# Patient Record
Sex: MALE | Race: WHITE | NOT HISPANIC OR LATINO
[De-identification: names, ages, dates, MRNs, and addresses within clinical notes are randomized per-mention and may not be internally consistent; named-entity substitution may affect disease eponyms.]

---

## 2018-09-24 PROBLEM — Z00.00 ENCOUNTER FOR PREVENTIVE HEALTH EXAMINATION: Status: ACTIVE | Noted: 2018-09-24

## 2018-10-25 ENCOUNTER — APPOINTMENT (OUTPATIENT)
Dept: INTERNAL MEDICINE | Facility: CLINIC | Age: 63
End: 2018-10-25

## 2022-05-19 ENCOUNTER — EMERGENCY (EMERGENCY)
Facility: HOSPITAL | Age: 67
LOS: 1 days | Discharge: HOME | End: 2022-05-19
Attending: EMERGENCY MEDICINE
Payer: COMMERCIAL

## 2022-05-19 VITALS
RESPIRATION RATE: 18 BRPM | DIASTOLIC BLOOD PRESSURE: 82 MMHG | OXYGEN SATURATION: 96 % | WEIGHT: 162.04 LBS | HEIGHT: 71 IN | SYSTOLIC BLOOD PRESSURE: 120 MMHG | TEMPERATURE: 98 F | HEART RATE: 91 BPM

## 2022-05-19 PROCEDURE — 93010 ELECTROCARDIOGRAM REPORT: CPT

## 2022-05-19 PROCEDURE — 99285 EMERGENCY DEPT VISIT HI MDM: CPT

## 2022-05-20 VITALS
HEART RATE: 65 BPM | TEMPERATURE: 98 F | OXYGEN SATURATION: 98 % | SYSTOLIC BLOOD PRESSURE: 133 MMHG | RESPIRATION RATE: 16 BRPM | DIASTOLIC BLOOD PRESSURE: 77 MMHG

## 2022-05-20 LAB
ALBUMIN SERPL ELPH-MCNC: 4.5 G/DL — SIGNIFICANT CHANGE UP (ref 3.3–5)
ALP SERPL-CCNC: 77 U/L — SIGNIFICANT CHANGE UP (ref 40–120)
ALT FLD-CCNC: 13 U/L — SIGNIFICANT CHANGE UP (ref 10–45)
ANION GAP SERPL CALC-SCNC: 10 MMOL/L — SIGNIFICANT CHANGE UP (ref 5–17)
ANISOCYTOSIS BLD QL: SLIGHT — SIGNIFICANT CHANGE UP
APPEARANCE UR: CLEAR — SIGNIFICANT CHANGE UP
AST SERPL-CCNC: 17 U/L — SIGNIFICANT CHANGE UP (ref 10–40)
BASE EXCESS BLDV CALC-SCNC: 3 MMOL/L — HIGH (ref -2–2)
BASOPHILS # BLD AUTO: 0 K/UL — SIGNIFICANT CHANGE UP (ref 0–0.2)
BASOPHILS NFR BLD AUTO: 0 % — SIGNIFICANT CHANGE UP (ref 0–2)
BILIRUB SERPL-MCNC: 0.6 MG/DL — SIGNIFICANT CHANGE UP (ref 0.2–1.2)
BILIRUB UR-MCNC: NEGATIVE — SIGNIFICANT CHANGE UP
BUN SERPL-MCNC: 13 MG/DL — SIGNIFICANT CHANGE UP (ref 7–23)
CA-I SERPL-SCNC: 1.23 MMOL/L — SIGNIFICANT CHANGE UP (ref 1.15–1.33)
CALCIUM SERPL-MCNC: 9.4 MG/DL — SIGNIFICANT CHANGE UP (ref 8.4–10.5)
CHLORIDE BLDV-SCNC: 103 MMOL/L — SIGNIFICANT CHANGE UP (ref 96–108)
CHLORIDE SERPL-SCNC: 104 MMOL/L — SIGNIFICANT CHANGE UP (ref 96–108)
CO2 BLDV-SCNC: 30 MMOL/L — HIGH (ref 22–26)
CO2 SERPL-SCNC: 25 MMOL/L — SIGNIFICANT CHANGE UP (ref 22–31)
COLOR SPEC: YELLOW — SIGNIFICANT CHANGE UP
CREAT SERPL-MCNC: 0.62 MG/DL — SIGNIFICANT CHANGE UP (ref 0.5–1.3)
DACRYOCYTES BLD QL SMEAR: SLIGHT — SIGNIFICANT CHANGE UP
DIFF PNL FLD: NEGATIVE — SIGNIFICANT CHANGE UP
EGFR: 105 ML/MIN/1.73M2 — SIGNIFICANT CHANGE UP
ELLIPTOCYTES BLD QL SMEAR: SLIGHT — SIGNIFICANT CHANGE UP
EOSINOPHIL # BLD AUTO: 0 K/UL — SIGNIFICANT CHANGE UP (ref 0–0.5)
EOSINOPHIL NFR BLD AUTO: 0 % — SIGNIFICANT CHANGE UP (ref 0–6)
FLUAV AG NPH QL: SIGNIFICANT CHANGE UP
FLUBV AG NPH QL: SIGNIFICANT CHANGE UP
GAS PNL BLDV: 136 MMOL/L — SIGNIFICANT CHANGE UP (ref 136–145)
GAS PNL BLDV: SIGNIFICANT CHANGE UP
GAS PNL BLDV: SIGNIFICANT CHANGE UP
GIANT PLATELETS BLD QL SMEAR: PRESENT — SIGNIFICANT CHANGE UP
GLUCOSE BLDV-MCNC: 100 MG/DL — HIGH (ref 70–99)
GLUCOSE SERPL-MCNC: 102 MG/DL — HIGH (ref 70–99)
GLUCOSE UR QL: NEGATIVE — SIGNIFICANT CHANGE UP
HCO3 BLDV-SCNC: 29 MMOL/L — SIGNIFICANT CHANGE UP (ref 22–29)
HCT VFR BLD CALC: 39 % — SIGNIFICANT CHANGE UP (ref 39–50)
HCT VFR BLDA CALC: 40 % — SIGNIFICANT CHANGE UP (ref 39–51)
HGB BLD CALC-MCNC: 13.2 G/DL — SIGNIFICANT CHANGE UP (ref 12.6–17.4)
HGB BLD-MCNC: 12.6 G/DL — LOW (ref 13–17)
HYPOCHROMIA BLD QL: SLIGHT — SIGNIFICANT CHANGE UP
KETONES UR-MCNC: NEGATIVE — SIGNIFICANT CHANGE UP
LACTATE BLDV-MCNC: 0.8 MMOL/L — SIGNIFICANT CHANGE UP (ref 0.7–2)
LEUKOCYTE ESTERASE UR-ACNC: NEGATIVE — SIGNIFICANT CHANGE UP
LIDOCAIN IGE QN: 31 U/L — SIGNIFICANT CHANGE UP (ref 7–60)
LYMPHOCYTES # BLD AUTO: 2.88 K/UL — SIGNIFICANT CHANGE UP (ref 1–3.3)
LYMPHOCYTES # BLD AUTO: 30.4 % — SIGNIFICANT CHANGE UP (ref 13–44)
MANUAL SMEAR VERIFICATION: SIGNIFICANT CHANGE UP
MCHC RBC-ENTMCNC: 21.8 PG — LOW (ref 27–34)
MCHC RBC-ENTMCNC: 32.3 GM/DL — SIGNIFICANT CHANGE UP (ref 32–36)
MCV RBC AUTO: 67.6 FL — LOW (ref 80–100)
MICROCYTES BLD QL: SLIGHT — SIGNIFICANT CHANGE UP
MONOCYTES # BLD AUTO: 0.33 K/UL — SIGNIFICANT CHANGE UP (ref 0–0.9)
MONOCYTES NFR BLD AUTO: 3.5 % — SIGNIFICANT CHANGE UP (ref 2–14)
NEUTROPHILS # BLD AUTO: 6.27 K/UL — SIGNIFICANT CHANGE UP (ref 1.8–7.4)
NEUTROPHILS NFR BLD AUTO: 66.1 % — SIGNIFICANT CHANGE UP (ref 43–77)
NITRITE UR-MCNC: NEGATIVE — SIGNIFICANT CHANGE UP
OVALOCYTES BLD QL SMEAR: SIGNIFICANT CHANGE UP
PCO2 BLDV: 49 MMHG — SIGNIFICANT CHANGE UP (ref 42–55)
PH BLDV: 7.38 — SIGNIFICANT CHANGE UP (ref 7.32–7.43)
PH UR: 6.5 — SIGNIFICANT CHANGE UP (ref 5–8)
PLAT MORPH BLD: NORMAL — SIGNIFICANT CHANGE UP
PLATELET # BLD AUTO: 292 K/UL — SIGNIFICANT CHANGE UP (ref 150–400)
PO2 BLDV: 56 MMHG — HIGH (ref 25–45)
POIKILOCYTOSIS BLD QL AUTO: SLIGHT — SIGNIFICANT CHANGE UP
POTASSIUM BLDV-SCNC: 4 MMOL/L — SIGNIFICANT CHANGE UP (ref 3.5–5.1)
POTASSIUM SERPL-MCNC: 4 MMOL/L — SIGNIFICANT CHANGE UP (ref 3.5–5.3)
POTASSIUM SERPL-SCNC: 4 MMOL/L — SIGNIFICANT CHANGE UP (ref 3.5–5.3)
PROT SERPL-MCNC: 7.2 G/DL — SIGNIFICANT CHANGE UP (ref 6–8.3)
PROT UR-MCNC: SIGNIFICANT CHANGE UP
RBC # BLD: 5.77 M/UL — SIGNIFICANT CHANGE UP (ref 4.2–5.8)
RBC # FLD: 15.5 % — HIGH (ref 10.3–14.5)
RBC BLD AUTO: ABNORMAL
RSV RNA NPH QL NAA+NON-PROBE: SIGNIFICANT CHANGE UP
SAO2 % BLDV: 85.4 % — SIGNIFICANT CHANGE UP (ref 67–88)
SARS-COV-2 RNA SPEC QL NAA+PROBE: SIGNIFICANT CHANGE UP
SODIUM SERPL-SCNC: 139 MMOL/L — SIGNIFICANT CHANGE UP (ref 135–145)
SP GR SPEC: 1.02 — SIGNIFICANT CHANGE UP (ref 1.01–1.02)
TARGETS BLD QL SMEAR: SIGNIFICANT CHANGE UP
UROBILINOGEN FLD QL: NEGATIVE — SIGNIFICANT CHANGE UP
WBC # BLD: 9.49 K/UL — SIGNIFICANT CHANGE UP (ref 3.8–10.5)
WBC # FLD AUTO: 9.49 K/UL — SIGNIFICANT CHANGE UP (ref 3.8–10.5)

## 2022-05-20 PROCEDURE — 82947 ASSAY GLUCOSE BLOOD QUANT: CPT

## 2022-05-20 PROCEDURE — 85018 HEMOGLOBIN: CPT

## 2022-05-20 PROCEDURE — 76705 ECHO EXAM OF ABDOMEN: CPT | Mod: 26

## 2022-05-20 PROCEDURE — 84132 ASSAY OF SERUM POTASSIUM: CPT

## 2022-05-20 PROCEDURE — 93005 ELECTROCARDIOGRAM TRACING: CPT

## 2022-05-20 PROCEDURE — 36415 COLL VENOUS BLD VENIPUNCTURE: CPT

## 2022-05-20 PROCEDURE — 83690 ASSAY OF LIPASE: CPT

## 2022-05-20 PROCEDURE — 81003 URINALYSIS AUTO W/O SCOPE: CPT

## 2022-05-20 PROCEDURE — 83605 ASSAY OF LACTIC ACID: CPT

## 2022-05-20 PROCEDURE — 80053 COMPREHEN METABOLIC PANEL: CPT

## 2022-05-20 PROCEDURE — 82330 ASSAY OF CALCIUM: CPT

## 2022-05-20 PROCEDURE — 85014 HEMATOCRIT: CPT

## 2022-05-20 PROCEDURE — 87086 URINE CULTURE/COLONY COUNT: CPT

## 2022-05-20 PROCEDURE — 87637 SARSCOV2&INF A&B&RSV AMP PRB: CPT

## 2022-05-20 PROCEDURE — 82435 ASSAY OF BLOOD CHLORIDE: CPT

## 2022-05-20 PROCEDURE — 99285 EMERGENCY DEPT VISIT HI MDM: CPT | Mod: 25

## 2022-05-20 PROCEDURE — 85025 COMPLETE CBC W/AUTO DIFF WBC: CPT

## 2022-05-20 PROCEDURE — 76705 ECHO EXAM OF ABDOMEN: CPT

## 2022-05-20 PROCEDURE — 82803 BLOOD GASES ANY COMBINATION: CPT

## 2022-05-20 PROCEDURE — 84295 ASSAY OF SERUM SODIUM: CPT

## 2022-05-20 PROCEDURE — 96374 THER/PROPH/DIAG INJ IV PUSH: CPT

## 2022-05-20 RX ORDER — FAMOTIDINE 10 MG/ML
1 INJECTION INTRAVENOUS
Qty: 28 | Refills: 0
Start: 2022-05-20 | End: 2022-06-02

## 2022-05-20 RX ORDER — LIDOCAINE 4 G/100G
10 CREAM TOPICAL ONCE
Refills: 0 | Status: COMPLETED | OUTPATIENT
Start: 2022-05-20 | End: 2022-05-20

## 2022-05-20 RX ORDER — FAMOTIDINE 10 MG/ML
20 INJECTION INTRAVENOUS ONCE
Refills: 0 | Status: COMPLETED | OUTPATIENT
Start: 2022-05-20 | End: 2022-05-20

## 2022-05-20 RX ADMIN — FAMOTIDINE 20 MILLIGRAM(S): 10 INJECTION INTRAVENOUS at 01:03

## 2022-05-20 RX ADMIN — LIDOCAINE 10 MILLILITER(S): 4 CREAM TOPICAL at 03:24

## 2022-05-20 RX ADMIN — Medication 30 MILLILITER(S): at 03:25

## 2022-05-20 NOTE — ED PROVIDER NOTE - NSFOLLOWUPINSTRUCTIONS_ED_ALL_ED_FT
(1) Follow up with your primary care physician as discussed. Listed below are the specialists that will be necessary to see as an outpatient to continue the workup.  Please call the numbers listed below or [phone] to set up the necessary appointments.    (2) Immediately seek care at your nearest emergency room if your symptoms worsen, persist, or do not resolve (1) Follow up with your primary care physician as discussed. Listed below are the specialists that will be necessary to see as an outpatient to continue the workup.  Please call the numbers listed below or [phone] to set up the necessary appointments.    (2) Immediately seek care at your nearest emergency room if your symptoms worsen, persist, or do not resolve      Epigastric Pain    WHAT YOU NEED TO KNOW:    Epigastric pain is felt in the middle of the upper abdomen, between the ribs and the bellybutton. The pain may be mild or severe. Pain may spread from or to another part of your body. Epigastric pain may be a sign of a serious health problem that needs to be treated.     DISCHARGE INSTRUCTIONS:    Call 911 for any of the following:   •You have any of the following signs of a heart attack: ?Squeezing, pressure, or pain in your chest  •You may also have any of the following:   •Discomfort or pain in your back, neck, jaw, stomach, or arm  •Shortness of breath  •Nausea or vomiting  •Lightheadedness or a sudden cold sweat  •You have severe pain that radiates to your jaw or back.    Return to the emergency department if:   •You have severe pain that starts suddenly and quickly gets worse.  •You cannot have a bowel movement and are vomiting.  •You vomit or cough up blood.  •You see blood in your urine or bowel movement.  •You feel drowsy and your breathing is slower than usual.    Contact your healthcare provider if:   •You have a fever or chills.  •You have yellowing of your skin or the whites of your eyes.  •You vomit often or several times in a row.   •You lose weight without trying.  •You have symptoms for longer than 2 weeks.  •You have questions or concerns about your condition or care.

## 2022-05-20 NOTE — ED PROVIDER NOTE - OBJECTIVE STATEMENT
68 y/o M w/ pmhx of HLD, meditereanin anemia p/w epigastric and ruq pain for 10 days. States it is intermittant and not assoicated w/ eating. Pt went to  today who told the pt to go to the ED for further eval and blood work. Pt denies any recent f/c, n/v, cp, sob. States last flatus and BM was today (prior to ED arrival). Denies dysuria or hematuria. Past surgical hx for hernia repair 10 and 20 yrs ago respectively. 66 y/o M w/ pmhx of HLD, meditereanin anemia p/w epigastric and ruq pain for 10 days. Also endorses burning epigastric pain. States ruq pain is not associated w/ eating or moving. States it is intermittant and not assoicated w/ eating. Pt went to  today who told the pt to go to the ED for further eval and blood work. Pt denies any recent f/c, n/v, cp, sob. States last flatus and BM was today (prior to ED arrival). Denies dysuria or hematuria. Pt does states that yesterday he has RLQ pain. Past surgical hx for hernia repair 10 and 20 yrs ago respectively. Current smoker. Denies etoh use. 68 y/o M w/ pmhx of HLD, meditereanin anemia p/w epigastric and ruq pain for 10 days. Also endorses burning epigastric pain. States ruq pain is not associated w/ eating or moving. States it is intermittant and not assoicated w/ eating. Pt went to  today who told the pt to go to the ED for further eval and blood work. Pt denies any recent f/c, n/v, cp, sob. States last flatus and BM was today (prior to ED arrival). Denies dysuria or hematuria. Pt does states that yesterday he has RLQ pain. Past surgical hx for hernia repair 10 and 20 yrs ago respectively. Current smoker. Denies etoh use. Pt states he has been taking PPI/H2 blocker intermittantly at home.

## 2022-05-20 NOTE — ED PROVIDER NOTE - PHYSICAL EXAMINATION
CONSTITUTIONAL: Well-developed; well-nourished; in no acute distress.   SKIN: warm, dry  HEAD: Normocephalic; atraumatic.  EYES: no conjunctival injection. PERRL.   ENT: No nasal discharge; airway clear.  NECK: Supple; non tender.  CARD: S1, S2 normal; no murmurs, gallops, or rubs. Regular rate and rhythm.   RESP: No wheezes, rales or rhonchi. Good air movement bilaterally.   ABD: +epigastric pain. +mild distention noted. No CVA tenderness   EXT: Ambulates independently.  No cyanosis or edema.   NEURO: Alert, oriented, grossly unremarkable  PSYCH: Cooperative, appropriate.

## 2022-05-20 NOTE — ED PROVIDER NOTE - CLINICAL SUMMARY MEDICAL DECISION MAKING FREE TEXT BOX
O'Scott DO PGY-2: pt p/w epigastric and ruq pain for 10 days. Pain is intermittant also has burning sensation as well. Pshx of hernia repair 10 and 20 yrs ago. DDx include but not limited to: gastritis vs cholecystits vs biliary colic vs Uti

## 2022-05-20 NOTE — ED PROVIDER NOTE - PATIENT PORTAL LINK FT
You can access the FollowMyHealth Patient Portal offered by [facility] by registering at the following website: [email or web address]. By joining [employer]’s FollowMyHealth portal, you will also be able to view your health information using other applications (apps) compatible with our system.

## 2022-05-20 NOTE — ED ADULT NURSE NOTE - TEMPLATE
Please request a peer to peer for this can be approved today or [facility] will cxl the appointment for Monday.   Abdominal Pain, N/V/D

## 2022-05-20 NOTE — ED PROVIDER NOTE - PROGRESS NOTE DETAILS
Attending MD Riley: Patient re-evaluated and feeling improved.  No acute issues at  this time.  Lab and radiology tests reviewed with patient and wife.  Will give more GI cocktail.  Patient stable for discharge. Follow up instructions given, importance of follow up emphasized, return to ED parameters reviewed and patient verbalized understanding.  All questions answered, all concerns addressed. Angelica BRIDGES PGY-2: Results explained to patient. Explained strict return precautions. Will give GI followup Angelica BRIDGES PGY-2: Results explained to patient. Explained strict return precautions. Will give GI followup. Will send famotidine to pharmacy

## 2022-05-20 NOTE — ED ADULT NURSE REASSESSMENT NOTE - NS ED NURSE REASSESS COMMENT FT1
Report received from RACHNA Jones. Pt a & o x 4, able to follow commands. Breathing spontaneous & nonlabored. Abdomen soft & nondistended. IV site patent, no signs of phlebitis, flushing without difficulty. Pt states pain has improved, awaiting USr

## 2022-05-20 NOTE — ED PROVIDER NOTE - NSFOLLOWUPCLINICS_GEN_ALL_ED_FT
Gastroenterology at [facility]  Gastroenterology  [address] [place] [id number]  Phone: [phone]  Fax:   Follow Up Time: 4-6 Days

## 2022-05-20 NOTE — ED PROVIDER NOTE - ATTENDING CONTRIBUTION TO CARE
Attending MD Riley: I personally have seen and examined this patient.  Resident note reviewed and agree on plan of care and except where noted.  See below for details.     Seen in [place], accompanied by wife    67M with PMH/PSH including HLD, s/p bilateral inguinal hernia repair (10 and 20 yrs ago) presents to the ED with 10 days of epigastric and RUQ abdominal pain.  Reports abdominal pain, reports occasionally worse after po intake.  Denies nausea, vomiting, diarrhea, bloody or black stools.  Denies fevers.  Denies chest pain, shortness of breath. Denies dysuria, hematuria, change in urinary habits including frequency, urgency. Reports previous episode about 10 yrs ago, reports was told it was acid reflux.  Reports this feels similar.  Reports took Pepcid yesterday without improvement.  Reports took Prevacid without improvement.  Reports last BM last night.  Denies EtOH, +tobacco.  Denies weight loss.  Colonoscopy and Endoscopy with Dr. Katie LUCIANO last year, reports normal.      At time of exam, reports no abdominal pain.    On exam, head NCAT, PERRL, lungs CTAB with good inspiratory effort, +S1S2, no m/r/g, abdomen soft with +BS, NT, ND, moving all extremities    A/P: 67M with epqigastric and RUQ pain, will obtain labs for LFTs, lipase for possible pancreatitis, will obtain US RUQ to eval for biliary pathology, will give GI cocktail, reassess

## 2022-05-20 NOTE — ED ADULT NURSE NOTE - OBJECTIVE STATEMENT
68 yo M pmh of HCL, mediterranean anemia ambulated to ED c/o epigastric pain and RUQ abdominal pain x 10 days.  Pt states that he was seen at  and was advised to come to ED.  Pt endorses worsening pain and bloating after eating.  Pt also endorses taking motrin on an empty stomach most days during the week.  Denies CP, back pain, SOB, fevers/chills, n/v/d, lightheadedness, dizziness, changes in urinary or bowel habits.  A&Ox4 ,abdomen soft, nondistended currently, tender to palpation in the epigastric region and the RUQ.  Skin w/d/i.  NAD.  VSS.  safety and comfort maintained.  SO present at bedside. Will continue to monitor.

## 2022-05-21 LAB
CULTURE RESULTS: SIGNIFICANT CHANGE UP
SPECIMEN SOURCE: SIGNIFICANT CHANGE UP

## 2022-05-31 ENCOUNTER — INPATIENT (INPATIENT)
Facility: HOSPITAL | Age: 67
LOS: 2 days | Discharge: HOME | DRG: 384 | End: 2022-06-03
Attending: INTERNAL MEDICINE | Admitting: HOSPITALIST
Payer: COMMERCIAL

## 2022-05-31 VITALS
RESPIRATION RATE: 20 BRPM | WEIGHT: 160.06 LBS | HEIGHT: 71 IN | DIASTOLIC BLOOD PRESSURE: 77 MMHG | SYSTOLIC BLOOD PRESSURE: 188 MMHG | HEART RATE: 81 BPM | OXYGEN SATURATION: 97 % | TEMPERATURE: 98 F

## 2022-05-31 PROCEDURE — 93010 ELECTROCARDIOGRAM REPORT: CPT | Mod: 76

## 2022-05-31 PROCEDURE — 99291 CRITICAL CARE FIRST HOUR: CPT | Mod: 25

## 2022-06-01 DIAGNOSIS — R10.9 UNSPECIFIED ABDOMINAL PAIN: ICD-10-CM

## 2022-06-01 DIAGNOSIS — Z29.9 ENCOUNTER FOR PROPHYLACTIC MEASURES, UNSPECIFIED: ICD-10-CM

## 2022-06-01 DIAGNOSIS — D64.9 ANEMIA, UNSPECIFIED: ICD-10-CM

## 2022-06-01 DIAGNOSIS — E78.5 HYPERLIPIDEMIA, UNSPECIFIED: ICD-10-CM

## 2022-06-01 DIAGNOSIS — Z98.890 OTHER SPECIFIED POSTPROCEDURAL STATES: Chronic | ICD-10-CM

## 2022-06-01 DIAGNOSIS — I10 ESSENTIAL (PRIMARY) HYPERTENSION: ICD-10-CM

## 2022-06-01 LAB
A1C WITH ESTIMATED AVERAGE GLUCOSE RESULT: 5.3 % — SIGNIFICANT CHANGE UP (ref 4–5.6)
ALBUMIN SERPL ELPH-MCNC: 4 G/DL — SIGNIFICANT CHANGE UP (ref 3.3–5)
ALP SERPL-CCNC: 67 U/L — SIGNIFICANT CHANGE UP (ref 40–120)
ALT FLD-CCNC: 9 U/L — LOW (ref 10–45)
ANION GAP SERPL CALC-SCNC: 10 MMOL/L — SIGNIFICANT CHANGE UP (ref 5–17)
ANISOCYTOSIS BLD QL: SLIGHT — SIGNIFICANT CHANGE UP
APPEARANCE UR: CLEAR — SIGNIFICANT CHANGE UP
APTT BLD: 27.3 SEC — LOW (ref 27.5–35.5)
APTT BLD: 44.1 SEC — HIGH (ref 27.5–35.5)
AST SERPL-CCNC: 13 U/L — SIGNIFICANT CHANGE UP (ref 10–40)
BASE EXCESS BLDV CALC-SCNC: 3 MMOL/L — HIGH (ref -2–2)
BASOPHILS # BLD AUTO: 0 K/UL — SIGNIFICANT CHANGE UP (ref 0–0.2)
BASOPHILS NFR BLD AUTO: 0 % — SIGNIFICANT CHANGE UP (ref 0–2)
BILIRUB SERPL-MCNC: 0.7 MG/DL — SIGNIFICANT CHANGE UP (ref 0.2–1.2)
BILIRUB UR-MCNC: NEGATIVE — SIGNIFICANT CHANGE UP
BLD GP AB SCN SERPL QL: NEGATIVE — SIGNIFICANT CHANGE UP
BUN SERPL-MCNC: 10 MG/DL — SIGNIFICANT CHANGE UP (ref 7–23)
CA-I SERPL-SCNC: 1.27 MMOL/L — SIGNIFICANT CHANGE UP (ref 1.15–1.33)
CALCIUM SERPL-MCNC: 9.2 MG/DL — SIGNIFICANT CHANGE UP (ref 8.4–10.5)
CHLORIDE BLDV-SCNC: 103 MMOL/L — SIGNIFICANT CHANGE UP (ref 96–108)
CHLORIDE SERPL-SCNC: 104 MMOL/L — SIGNIFICANT CHANGE UP (ref 96–108)
CHOLEST SERPL-MCNC: 127 MG/DL — SIGNIFICANT CHANGE UP
CO2 BLDV-SCNC: 31 MMOL/L — HIGH (ref 22–26)
CO2 SERPL-SCNC: 26 MMOL/L — SIGNIFICANT CHANGE UP (ref 22–31)
COLOR SPEC: COLORLESS — SIGNIFICANT CHANGE UP
CREAT SERPL-MCNC: 0.76 MG/DL — SIGNIFICANT CHANGE UP (ref 0.5–1.3)
DIFF PNL FLD: NEGATIVE — SIGNIFICANT CHANGE UP
EGFR: 99 ML/MIN/1.73M2 — SIGNIFICANT CHANGE UP
ELLIPTOCYTES BLD QL SMEAR: SLIGHT — SIGNIFICANT CHANGE UP
EOSINOPHIL # BLD AUTO: 0 K/UL — SIGNIFICANT CHANGE UP (ref 0–0.5)
EOSINOPHIL NFR BLD AUTO: 0 % — SIGNIFICANT CHANGE UP (ref 0–6)
ESTIMATED AVERAGE GLUCOSE: 105 MG/DL — SIGNIFICANT CHANGE UP (ref 68–114)
GAS PNL BLDV: 135 MMOL/L — LOW (ref 136–145)
GAS PNL BLDV: SIGNIFICANT CHANGE UP
GIANT PLATELETS BLD QL SMEAR: PRESENT — SIGNIFICANT CHANGE UP
GLUCOSE BLDV-MCNC: 92 MG/DL — SIGNIFICANT CHANGE UP (ref 70–99)
GLUCOSE SERPL-MCNC: 97 MG/DL — SIGNIFICANT CHANGE UP (ref 70–99)
GLUCOSE UR QL: NEGATIVE — SIGNIFICANT CHANGE UP
HCO3 BLDV-SCNC: 29 MMOL/L — SIGNIFICANT CHANGE UP (ref 22–29)
HCT VFR BLD CALC: 35.3 % — LOW (ref 39–50)
HCT VFR BLD CALC: 35.5 % — LOW (ref 39–50)
HCT VFR BLDA CALC: 36 % — LOW (ref 39–51)
HDLC SERPL-MCNC: 44 MG/DL — SIGNIFICANT CHANGE UP
HGB BLD CALC-MCNC: 12.1 G/DL — LOW (ref 12.6–17.4)
HGB BLD-MCNC: 11.5 G/DL — LOW (ref 13–17)
HGB BLD-MCNC: 11.6 G/DL — LOW (ref 13–17)
INR BLD: 1.04 RATIO — SIGNIFICANT CHANGE UP (ref 0.88–1.16)
KETONES UR-MCNC: NEGATIVE — SIGNIFICANT CHANGE UP
LACTATE BLDV-MCNC: 1 MMOL/L — SIGNIFICANT CHANGE UP (ref 0.7–2)
LEUKOCYTE ESTERASE UR-ACNC: NEGATIVE — SIGNIFICANT CHANGE UP
LIDOCAIN IGE QN: 18 U/L — SIGNIFICANT CHANGE UP (ref 7–60)
LIPID PNL WITH DIRECT LDL SERPL: 70 MG/DL — SIGNIFICANT CHANGE UP
LYMPHOCYTES # BLD AUTO: 2.06 K/UL — SIGNIFICANT CHANGE UP (ref 1–3.3)
LYMPHOCYTES # BLD AUTO: 20.2 % — SIGNIFICANT CHANGE UP (ref 13–44)
MANUAL SMEAR VERIFICATION: SIGNIFICANT CHANGE UP
MCHC RBC-ENTMCNC: 21.8 PG — LOW (ref 27–34)
MCHC RBC-ENTMCNC: 22.1 PG — LOW (ref 27–34)
MCHC RBC-ENTMCNC: 32.4 GM/DL — SIGNIFICANT CHANGE UP (ref 32–36)
MCHC RBC-ENTMCNC: 32.9 GM/DL — SIGNIFICANT CHANGE UP (ref 32–36)
MCV RBC AUTO: 67.2 FL — LOW (ref 80–100)
MCV RBC AUTO: 67.4 FL — LOW (ref 80–100)
MICROCYTES BLD QL: SIGNIFICANT CHANGE UP
MONOCYTES # BLD AUTO: 0.36 K/UL — SIGNIFICANT CHANGE UP (ref 0–0.9)
MONOCYTES NFR BLD AUTO: 3.5 % — SIGNIFICANT CHANGE UP (ref 2–14)
NEUTROPHILS # BLD AUTO: 7.79 K/UL — HIGH (ref 1.8–7.4)
NEUTROPHILS NFR BLD AUTO: 76.3 % — SIGNIFICANT CHANGE UP (ref 43–77)
NITRITE UR-MCNC: NEGATIVE — SIGNIFICANT CHANGE UP
NON HDL CHOLESTEROL: 84 MG/DL — SIGNIFICANT CHANGE UP
NRBC # BLD: 0 /100 WBCS — SIGNIFICANT CHANGE UP (ref 0–0)
OVALOCYTES BLD QL SMEAR: SLIGHT — SIGNIFICANT CHANGE UP
PCO2 BLDV: 52 MMHG — SIGNIFICANT CHANGE UP (ref 42–55)
PH BLDV: 7.36 — SIGNIFICANT CHANGE UP (ref 7.32–7.43)
PH UR: 7 — SIGNIFICANT CHANGE UP (ref 5–8)
PLAT MORPH BLD: NORMAL — SIGNIFICANT CHANGE UP
PLATELET # BLD AUTO: 244 K/UL — SIGNIFICANT CHANGE UP (ref 150–400)
PLATELET # BLD AUTO: 249 K/UL — SIGNIFICANT CHANGE UP (ref 150–400)
PO2 BLDV: 38 MMHG — SIGNIFICANT CHANGE UP (ref 25–45)
POLYCHROMASIA BLD QL SMEAR: SLIGHT — SIGNIFICANT CHANGE UP
POTASSIUM BLDV-SCNC: 4 MMOL/L — SIGNIFICANT CHANGE UP (ref 3.5–5.1)
POTASSIUM SERPL-MCNC: 4 MMOL/L — SIGNIFICANT CHANGE UP (ref 3.5–5.3)
POTASSIUM SERPL-SCNC: 4 MMOL/L — SIGNIFICANT CHANGE UP (ref 3.5–5.3)
PROT SERPL-MCNC: 6.8 G/DL — SIGNIFICANT CHANGE UP (ref 6–8.3)
PROT UR-MCNC: NEGATIVE — SIGNIFICANT CHANGE UP
PROTHROM AB SERPL-ACNC: 12.1 SEC — SIGNIFICANT CHANGE UP (ref 10.5–13.4)
RBC # BLD: 5.25 M/UL — SIGNIFICANT CHANGE UP (ref 4.2–5.8)
RBC # BLD: 5.27 M/UL — SIGNIFICANT CHANGE UP (ref 4.2–5.8)
RBC # FLD: 15.2 % — HIGH (ref 10.3–14.5)
RBC # FLD: 15.3 % — HIGH (ref 10.3–14.5)
RBC BLD AUTO: ABNORMAL
RH IG SCN BLD-IMP: POSITIVE — SIGNIFICANT CHANGE UP
SAO2 % BLDV: 62.3 % — LOW (ref 67–88)
SARS-COV-2 RNA SPEC QL NAA+PROBE: SIGNIFICANT CHANGE UP
SCHISTOCYTES BLD QL AUTO: SLIGHT — SIGNIFICANT CHANGE UP
SODIUM SERPL-SCNC: 140 MMOL/L — SIGNIFICANT CHANGE UP (ref 135–145)
SP GR SPEC: 1 — LOW (ref 1.01–1.02)
TARGETS BLD QL SMEAR: SIGNIFICANT CHANGE UP
TRIGL SERPL-MCNC: 68 MG/DL — SIGNIFICANT CHANGE UP
TROPONIN T, HIGH SENSITIVITY RESULT: <6 NG/L — SIGNIFICANT CHANGE UP (ref 0–51)
UROBILINOGEN FLD QL: NEGATIVE — SIGNIFICANT CHANGE UP
WBC # BLD: 10.21 K/UL — SIGNIFICANT CHANGE UP (ref 3.8–10.5)
WBC # BLD: 8.28 K/UL — SIGNIFICANT CHANGE UP (ref 3.8–10.5)
WBC # FLD AUTO: 10.21 K/UL — SIGNIFICANT CHANGE UP (ref 3.8–10.5)
WBC # FLD AUTO: 8.28 K/UL — SIGNIFICANT CHANGE UP (ref 3.8–10.5)

## 2022-06-01 PROCEDURE — 93306 TTE W/DOPPLER COMPLETE: CPT | Mod: 26

## 2022-06-01 PROCEDURE — 93308 TTE F-UP OR LMTD: CPT | Mod: 26

## 2022-06-01 PROCEDURE — 99223 1ST HOSP IP/OBS HIGH 75: CPT

## 2022-06-01 PROCEDURE — 71045 X-RAY EXAM CHEST 1 VIEW: CPT | Mod: 26

## 2022-06-01 PROCEDURE — 74177 CT ABD & PELVIS W/CONTRAST: CPT | Mod: 26

## 2022-06-01 RX ORDER — ATORVASTATIN CALCIUM 80 MG/1
80 TABLET, FILM COATED ORAL ONCE
Refills: 0 | Status: COMPLETED | OUTPATIENT
Start: 2022-06-01 | End: 2022-06-01

## 2022-06-01 RX ORDER — ASPIRIN/CALCIUM CARB/MAGNESIUM 324 MG
81 TABLET ORAL DAILY
Refills: 0 | Status: DISCONTINUED | OUTPATIENT
Start: 2022-06-01 | End: 2022-06-03

## 2022-06-01 RX ORDER — HEPARIN SODIUM 5000 [USP'U]/ML
4000 INJECTION INTRAVENOUS; SUBCUTANEOUS ONCE
Refills: 0 | Status: DISCONTINUED | OUTPATIENT
Start: 2022-06-01 | End: 2022-06-01

## 2022-06-01 RX ORDER — AMLODIPINE BESYLATE 2.5 MG/1
5 TABLET ORAL DAILY
Refills: 0 | Status: DISCONTINUED | OUTPATIENT
Start: 2022-06-01 | End: 2022-06-03

## 2022-06-01 RX ORDER — HEPARIN SODIUM 5000 [USP'U]/ML
4000 INJECTION INTRAVENOUS; SUBCUTANEOUS ONCE
Refills: 0 | Status: COMPLETED | OUTPATIENT
Start: 2022-06-01 | End: 2022-06-01

## 2022-06-01 RX ORDER — ASPIRIN/CALCIUM CARB/MAGNESIUM 324 MG
324 TABLET ORAL ONCE
Refills: 0 | Status: COMPLETED | OUTPATIENT
Start: 2022-06-01 | End: 2022-06-01

## 2022-06-01 RX ORDER — HEPARIN SODIUM 5000 [USP'U]/ML
4000 INJECTION INTRAVENOUS; SUBCUTANEOUS EVERY 6 HOURS
Refills: 0 | Status: DISCONTINUED | OUTPATIENT
Start: 2022-06-01 | End: 2022-06-01

## 2022-06-01 RX ORDER — ACETAMINOPHEN 500 MG
650 TABLET ORAL EVERY 6 HOURS
Refills: 0 | Status: DISCONTINUED | OUTPATIENT
Start: 2022-06-01 | End: 2022-06-03

## 2022-06-01 RX ORDER — INFLUENZA VIRUS VACCINE 15; 15; 15; 15 UG/.5ML; UG/.5ML; UG/.5ML; UG/.5ML
0.7 SUSPENSION INTRAMUSCULAR ONCE
Refills: 0 | Status: DISCONTINUED | OUTPATIENT
Start: 2022-06-01 | End: 2022-06-03

## 2022-06-01 RX ORDER — PANTOPRAZOLE SODIUM 20 MG/1
40 TABLET, DELAYED RELEASE ORAL
Refills: 0 | Status: DISCONTINUED | OUTPATIENT
Start: 2022-06-01 | End: 2022-06-03

## 2022-06-01 RX ORDER — FAMOTIDINE 10 MG/ML
20 INJECTION INTRAVENOUS
Refills: 0 | Status: DISCONTINUED | OUTPATIENT
Start: 2022-06-01 | End: 2022-06-01

## 2022-06-01 RX ORDER — LANOLIN ALCOHOL/MO/W.PET/CERES
3 CREAM (GRAM) TOPICAL AT BEDTIME
Refills: 0 | Status: DISCONTINUED | OUTPATIENT
Start: 2022-06-01 | End: 2022-06-03

## 2022-06-01 RX ORDER — ONDANSETRON 8 MG/1
4 TABLET, FILM COATED ORAL EVERY 8 HOURS
Refills: 0 | Status: DISCONTINUED | OUTPATIENT
Start: 2022-06-01 | End: 2022-06-03

## 2022-06-01 RX ORDER — HEPARIN SODIUM 5000 [USP'U]/ML
INJECTION INTRAVENOUS; SUBCUTANEOUS
Qty: 25000 | Refills: 0 | Status: DISCONTINUED | OUTPATIENT
Start: 2022-06-01 | End: 2022-06-01

## 2022-06-01 RX ORDER — SODIUM CHLORIDE 9 MG/ML
1000 INJECTION INTRAMUSCULAR; INTRAVENOUS; SUBCUTANEOUS ONCE
Refills: 0 | Status: COMPLETED | OUTPATIENT
Start: 2022-06-01 | End: 2022-06-01

## 2022-06-01 RX ORDER — KETOROLAC TROMETHAMINE 30 MG/ML
15 SYRINGE (ML) INJECTION ONCE
Refills: 0 | Status: DISCONTINUED | OUTPATIENT
Start: 2022-06-01 | End: 2022-06-01

## 2022-06-01 RX ORDER — HEPARIN SODIUM 5000 [USP'U]/ML
5000 INJECTION INTRAVENOUS; SUBCUTANEOUS EVERY 8 HOURS
Refills: 0 | Status: DISCONTINUED | OUTPATIENT
Start: 2022-06-01 | End: 2022-06-03

## 2022-06-01 RX ADMIN — AMLODIPINE BESYLATE 5 MILLIGRAM(S): 2.5 TABLET ORAL at 16:36

## 2022-06-01 RX ADMIN — HEPARIN SODIUM 1050 UNIT(S)/HR: 5000 INJECTION INTRAVENOUS; SUBCUTANEOUS at 10:16

## 2022-06-01 RX ADMIN — HEPARIN SODIUM 4000 UNIT(S): 5000 INJECTION INTRAVENOUS; SUBCUTANEOUS at 02:35

## 2022-06-01 RX ADMIN — Medication 30 MILLILITER(S): at 10:29

## 2022-06-01 RX ADMIN — PANTOPRAZOLE SODIUM 40 MILLIGRAM(S): 20 TABLET, DELAYED RELEASE ORAL at 22:32

## 2022-06-01 RX ADMIN — Medication 324 MILLIGRAM(S): at 02:13

## 2022-06-01 RX ADMIN — Medication 650 MILLIGRAM(S): at 07:36

## 2022-06-01 RX ADMIN — FAMOTIDINE 20 MILLIGRAM(S): 10 INJECTION INTRAVENOUS at 10:29

## 2022-06-01 RX ADMIN — HEPARIN SODIUM 900 UNIT(S)/HR: 5000 INJECTION INTRAVENOUS; SUBCUTANEOUS at 02:36

## 2022-06-01 RX ADMIN — Medication 15 MILLIGRAM(S): at 02:07

## 2022-06-01 RX ADMIN — HEPARIN SODIUM 5000 UNIT(S): 5000 INJECTION INTRAVENOUS; SUBCUTANEOUS at 16:38

## 2022-06-01 RX ADMIN — Medication 81 MILLIGRAM(S): at 16:37

## 2022-06-01 RX ADMIN — SODIUM CHLORIDE 1000 MILLILITER(S): 9 INJECTION INTRAMUSCULAR; INTRAVENOUS; SUBCUTANEOUS at 02:09

## 2022-06-01 RX ADMIN — ATORVASTATIN CALCIUM 80 MILLIGRAM(S): 80 TABLET, FILM COATED ORAL at 02:21

## 2022-06-01 NOTE — ED ADULT NURSE REASSESSMENT NOTE - NS ED NURSE REASSESS COMMENT FT1
Pt reports pain decreased from time of arrival. Pt does not want pain meds at this time. Appears comfortable, resting on stretcher.

## 2022-06-01 NOTE — CONSULT NOTE ADULT - SUBJECTIVE AND OBJECTIVE BOX
Armida Jeong MD  Cardiology Fellow  [phone]  All Cardiology service information can be found  on [email or web address], password: [email or web address]    Patient seen and evaluated at bedside    Chief Complaint:    HPI:  67M current smoker with history of HLD, anemia, presenting with abdominal pain x 11 days. Reports intermittent episodes last for minutes to 1 hour. Has nausea and anorexia. Improves with milk. Denies chest pain, no pain with exertion, no relief with rest. Pain has bloating sensation associated with it. Had no fevers, chills. Reports no orthopnea, palpitations, ARTEAGA, LE edema, or dizziness. Recently came to ED for similar symptoms.     EKG here showed some ST changes which resolved, no complaints of chest pain, and troponin was not elevated x2.       PMHx:   Hypercholesterolemia    Mediterranean anemia        PSHx:       Allergies:  No Known Allergies      Home Meds:    Current Medications:   heparin   Injectable 4000 Unit(s) IV Push every 6 hours PRN  heparin  Infusion.  Unit(s)/Hr IV Continuous <Continuous>      FAMILY HISTORY:      Social History:  Smoking History: Current     REVIEW OF SYSTEMS:    All other review of systems is negative unless indicated above.    Physical Exam:  T(F): 98.3 (), Max: 98.3 ()  HR: 64 () (64 - 81)  BP: 138/87 () (138/87 - 188/77)  RR: 18 (-)  SpO2: 97% ()  GENERAL: No acute distress, well-developed  HEAD:  Atraumatic, Normocephalic  ENT: EOMI, PERRLA, conjunctiva and sclera clear, Neck supple, No JVD, moist mucosa  CHEST/LUNG: Clear to auscultation bilaterally; No wheeze, equal breath sounds bilaterally   BACK: No spinal tenderness  HEART: Regular rate and rhythm; No murmurs, rubs, or gallops  ABDOMEN: distended   EXTREMITIES:  No clubbing, cyanosis, or edema  PSYCH: Nl behavior, nl affect  NEUROLOGY: AAOx3, non-focal, cranial nerves intact  SKIN: Normal color, No rashes or lesions  LINES:    Cardiovascular Diagnostic Testing:    ECG:   ST changes in anterior leads which resolved         CXR: Personally reviewed    Labs: Personally reviewed                        11.6   10.21 )-----------( 249      ( 2022 02:34 )             35.3     06-01    140  |  104  |  10  ----------------------------<  97  4.0   |  26  |  0.76    Ca    9.2      2022 02:34    TPro  6.8  /  Alb  4.0  /  TBili  0.7  /  DBili  x   /  AST  13  /  ALT  9<L>  /  AlkPhos  67  06-01    PT/INR - ( 2022 02:34 )   PT: 12.1 sec;   INR: 1.04 ratio         PTT - ( 2022 02:34 )  PTT:27.3 sec    CARDIAC MARKERS ( 2022 04:01 )  <6 ng/L / x     / x     / x     / x     / x      CARDIAC MARKERS ( :34 )  <6 ng/L / x     / x     / 38 U/L / x     / 2.1 ng/mL          Total Cholesterol: 127  LDL: --  HDL: 44  T

## 2022-06-01 NOTE — H&P ADULT - PROBLEM SELECTOR PLAN 1
-pt with extensive smoking history(80-pack-year) and HTN not on meds  -Given CASS in Ant leads, c/f ACS however trops neg x2  -s/p ASA loading and currently on heparin gtt for possible ACS  -evaluated by cards in ED and not impressed ACS, recommending repeat trops x3 and EKG in AM  -will keep on heparin gtt pending Cards re-eval in AM  -lipids wnl, f/u A1C. TTE ordered  -would evaluate for possible AAA given RFs as above; could also be related to PUD  -CT A/P ordered to evaluate for intraabdominal pathology. Would also evaluate for possible AAA  -pain meds prn  -c/w famotidine 20mg BID

## 2022-06-01 NOTE — ED PROVIDER NOTE - CLINICAL SUMMARY MEDICAL DECISION MAKING FREE TEXT BOX
Patient is a 66 y/o gentlemen w/ pmhx of HLD, mediterranean anemia p/w epigastric for 2-3 weeks. Abdominal pain is intermittent sharp and episodes last for 1 hour at time occasionally with associated mild nausea. The pain is not related to activity/exertion or eating. PE unremarkable other then mild reproducible abdominal tenderness.   Ddx: AWSTEMI vs. Vasospasm, less likely pericarditis.   Plan:  1. Emergent Cardiology consult placed for STEMI1  2. Aspirin 325mg loaded   3. Heparin drip stated   4. Hold PGY12 pending discussion with Cardiology   5. CXR   6. Lab work   7 Troponins to peak with [facility]

## 2022-06-01 NOTE — ED PROVIDER NOTE - PROGRESS NOTE DETAILS
Repeat EKG showed normalization of ST segments   Troponins negative x2. mitchell attending- gemma monique, thiago fellow recc tele admit no emergent cath. patient remains without active chest pain at this time, updated with results, hosp paged for admit mitchell attending- stemi on ekg, cards paged, asp 325, cards fellow to kinjal pettet attending- cards fellow recc rpt ekg trop at 1hr. heparin gtt started pettet attending- ekg similar but changing less wade ant leads delta trop pending discussed with cards fellow recc pending 2nd trop

## 2022-06-01 NOTE — CONSULT NOTE ADULT - SUBJECTIVE AND OBJECTIVE BOX
DATE OF SERVICE: 06-01-22 @ 10:32    CHIEF COMPLAINT:Patient is a 67y old  Male who presents with a chief complaint of abdominal pain (01 Jun 2022 06:34)      HISTORY OF PRESENT ILLNESS:  67M active smoker w/ PMH HLD, Mediterranean anemia p/w 2-wk h/o epigastric abdominal pain. Describes pain as sharp that started 2-weeks ago while doing some manual work at home. It is intermittent and usually last 30-40mins with peak intensity of 7/8 and has used advil with some relief. Of note, he presented to the ED a few days ago with same complaint with an unremarkable w/u including RUQ US and EKG that showed NSR with poor R-wave progression. Says that since discharge, the pain has progressively worsened and was so severe last night, he decided to come to the hospital for re-evaluation. He felt a bit nauseous but denied vomiting. He denied association with food,melena/hematochezia. Also denied Denied fever/chill, CP, SOB, dizziness.    ED course: Afebrile, BP elevated to 188/77. RR and O2 sats OK. EKG with CASS in Leads v1-v3,. Loaded with ASA 325mg and Started on Heparin gtt. Trops neg x1. Seen by cardiology (01 Jun 2022 06:34)      PAST MEDICAL & SURGICAL HISTORY:  Hypercholesterolemia  Mediterranean anemia  S/P hernia surgery      MEDICATIONS:  amLODIPine   Tablet 5 milliGRAM(s) Oral daily  heparin   Injectable 4000 Unit(s) IV Push every 6 hours PRN  heparin  Infusion.  Unit(s)/Hr IV Continuous <Continuous>        acetaminophen     Tablet .. 650 milliGRAM(s) Oral every 6 hours PRN  melatonin 3 milliGRAM(s) Oral at bedtime PRN  ondansetron Injectable 4 milliGRAM(s) IV Push every 8 hours PRN    aluminum hydroxide/magnesium hydroxide/simethicone Suspension 30 milliLiter(s) Oral every 4 hours PRN  famotidine    Tablet 20 milliGRAM(s) Oral two times a day          FAMILY HISTORY:  No pertinent family history in first degree relatives        Non-contributory    SOCIAL HISTORY:    Active smoker    Allergies:    No Known Allergies    Intolerances    	    REVIEW OF SYSTEMS:  CONSTITUTIONAL: No fever  EYES: No eye pain, visual disturbances, or discharge  ENMT:  No difficulty hearing, tinnitus  NECK: No pain or stiffness  RESPIRATORY: No cough, wheezing,  CARDIOVASCULAR: No chest pain, palpitations, passing out, dizziness, or leg swelling  GASTROINTESTINAL: + abdominal pain  GENITOURINARY: No dysuria, hematuria  NEUROLOGICAL: No stroke like symptoms  SKIN: No burning or lesions   ENDOCRINE: No heat or cold intolerance  MUSCULOSKELETAL: No joint pain or swelling  PSYCHIATRIC: No  anxiety, mood swings  HEME/LYMPH: No bleeding gums  ALLERGY AND IMMUNOLOGIC: No hives or eczema	    All other ROS negative    PHYSICAL EXAM:  T(C): 36.5 (06-01-22 @ 07:35), Max: 36.8 (06-01-22 @ 02:59)  HR: 68 (06-01-22 @ 07:35) (64 - 81)  BP: 141/87 (06-01-22 @ 07:35) (138/87 - 188/77)  RR: 18 (06-01-22 @ 07:35) (16 - 20)  SpO2: 97% (06-01-22 @ 07:35) (96% - 99%)  Wt(kg): --  I&O's Summary      Appearance: Normal	  HEENT:   Normal oral mucosa, EOMI	  Cardiovascular:  S1 S2, No JVD,    Respiratory: Lungs clear to auscultation	  Psychiatry: Alert  Gastrointestinal:  Soft, Non-tender, + BS	  Skin: No rashes   Neurologic: Non-focal  Extremities:  No edema  Vascular: Peripheral pulses palpable    	    	  	  CARDIAC MARKERS:  Labs personally reviewed by me                                  11.5   8.28  )-----------( 244      ( 01 Jun 2022 09:00 )             35.5     06-01    140  |  104  |  10  ----------------------------<  97  4.0   |  26  |  0.76    Ca    9.2      01 Jun 2022 02:34    TPro  6.8  /  Alb  4.0  /  TBili  0.7  /  DBili  x   /  AST  13  /  ALT  9<L>  /  AlkPhos  67  06-01          EKG: Personally reviewed by me - NSR  Radiology: Personally reviewed by me -     Xray Chest 1 View- PORTABLE-Urgent (Xray Chest 1 View- PORTABLE-Urgent .) (06.01.22 @ 02:55)  IMPRESSION:  Small left pleural effusion with left basilar atelectasis.    POCUS ED TTE 2D F/U, Limited w/o Cont. (06.01.22 @ 04:32)  INTERPRETATION:  A focused transthoracic cardiac ultrasound examination   was performed.  This was a limited exam.  No pericardial effusion was present.  RA appears prominent.  No global wall motion abnormality was identified.  A-line predominant bilateral lung fields.  IVC collapses with respiration.    IMPRESSION: No Pericardial Effusion.      Assessment /Plan:     Mr. Jordan is a 68 yo old male with PMH of active smoker, HTN, HLD and mediterranean anemia who presents with worsening abdominal pain. Cardiology consulted for concern of abnormal EKG. Patient denies chest pain, palpitations, edema, orthopnea, SOB or syncope. Does not have routine medical follow up. Has not had recent cardiology workup.     Problem/Plan -1  Problem:  - EKG with discreet cass in V1-V3 although not concerning for ischemia  - Troponin negative x2  - Patient denies chest pain or shortness of breath.   - POCUS reveals no pericardial effusion and no global WMA  - CXR with small left pleural efusion with left basilar atelectasis.  - No indication for heparin gtt at this time  - c/w ASA 81 mg PO daily   - Obtain formal TTE  - Consider CTA coronaries once GI discomfort is fully evaluated    Problem/Plan -2  Problem: HTN  - c.w amlodipine 5mg PO daily    Problem/Plan -3  Problem: HLD  - Check lipid panel     Problem/Plan -4  Problem: DVT PPX  - c/w Hep SQ    Problem/Plan -5  Problem: Smoking Cessation  - Discussed with patient at bedside.  - Will continue to support and  patient on importance of smoking cessation.        Differential diagnosis and plan of care discussed with patient after the evaluation. Counseling on diet, nutritional counseling, weight management, exercise and medication compliance was done.   Advanced care planning/advanced directives discussed with patient/family. DNR status including forceful chest compressions to attempt to restart the heart, ventilator support/artificial breathing, electric shock, artificial nutrition, health care proxy, Molst form all discussed with pt. Pt wishes to consider. More than fifteen minutes spent on discussing advanced directives.     Gisela Troy DO [facility]  Cardiovascular Medicine  [address] [place] [address], [address]  Office [phone]  Cell [phone] DATE OF SERVICE: 06-01-22 @ 10:32    CHIEF COMPLAINT:Patient is a 67y old  Male who presents with a chief complaint of abdominal pain (01 Jun 2022 06:34)      HISTORY OF PRESENT ILLNESS:  67M active smoker w/ PMH HLD, Mediterranean anemia p/w 2-wk h/o epigastric abdominal pain. Describes pain as sharp that started 2-weeks ago while doing some manual work at home. It is intermittent and usually last 30-40mins with peak intensity of 7/8 and has used advil with some relief. Of note, he presented to the ED a few days ago with same complaint with an unremarkable w/u including RUQ US and EKG that showed NSR with poor R-wave progression. Says that since discharge, the pain has progressively worsened and was so severe last night, he decided to come to the hospital for re-evaluation. He felt a bit nauseous but denied vomiting. He denied association with food,melena/hematochezia. Also denied Denied fever/chill, CP, SOB, dizziness.    ED course: Afebrile, BP elevated to 188/77. RR and O2 sats OK. EKG with CASS in Leads v1-v3,. Loaded with ASA 325mg and Started on Heparin gtt. Trops neg x1. Seen by cardiology (01 Jun 2022 06:34)      PAST MEDICAL & SURGICAL HISTORY:  Hypercholesterolemia  Mediterranean anemia  S/P hernia surgery      MEDICATIONS:  amLODIPine   Tablet 5 milliGRAM(s) Oral daily  heparin   Injectable 4000 Unit(s) IV Push every 6 hours PRN  heparin  Infusion.  Unit(s)/Hr IV Continuous <Continuous>        acetaminophen     Tablet .. 650 milliGRAM(s) Oral every 6 hours PRN  melatonin 3 milliGRAM(s) Oral at bedtime PRN  ondansetron Injectable 4 milliGRAM(s) IV Push every 8 hours PRN    aluminum hydroxide/magnesium hydroxide/simethicone Suspension 30 milliLiter(s) Oral every 4 hours PRN  famotidine    Tablet 20 milliGRAM(s) Oral two times a day          FAMILY HISTORY:  No pertinent family history in first degree relatives        Non-contributory    SOCIAL HISTORY:    Active smoker    Allergies:    No Known Allergies    Intolerances    	    REVIEW OF SYSTEMS:  CONSTITUTIONAL: No fever  EYES: No eye pain, visual disturbances, or discharge  ENMT:  No difficulty hearing, tinnitus  NECK: No pain or stiffness  RESPIRATORY: No cough, wheezing,  CARDIOVASCULAR: No chest pain, palpitations, passing out, dizziness, or leg swelling  GASTROINTESTINAL: + abdominal pain  GENITOURINARY: No dysuria, hematuria  NEUROLOGICAL: No stroke like symptoms  SKIN: No burning or lesions   ENDOCRINE: No heat or cold intolerance  MUSCULOSKELETAL: No joint pain or swelling  PSYCHIATRIC: No  anxiety, mood swings  HEME/LYMPH: No bleeding gums  ALLERGY AND IMMUNOLOGIC: No hives or eczema	    All other ROS negative    PHYSICAL EXAM:  T(C): 36.5 (06-01-22 @ 07:35), Max: 36.8 (06-01-22 @ 02:59)  HR: 68 (06-01-22 @ 07:35) (64 - 81)  BP: 141/87 (06-01-22 @ 07:35) (138/87 - 188/77)  RR: 18 (06-01-22 @ 07:35) (16 - 20)  SpO2: 97% (06-01-22 @ 07:35) (96% - 99%)  Wt(kg): --  I&O's Summary      Appearance: Normal	  HEENT:   Normal oral mucosa, EOMI	  Cardiovascular:  S1 S2, No JVD,    Respiratory: Lungs clear to auscultation	  Psychiatry: Alert  Gastrointestinal:  Soft, Non-tender, + BS	  Skin: No rashes   Neurologic: Non-focal  Extremities:  No edema  Vascular: Peripheral pulses palpable    	    	  	  CARDIAC MARKERS:  Labs personally reviewed by me                                  11.5   8.28  )-----------( 244      ( 01 Jun 2022 09:00 )             35.5     06-01    140  |  104  |  10  ----------------------------<  97  4.0   |  26  |  0.76    Ca    9.2      01 Jun 2022 02:34    TPro  6.8  /  Alb  4.0  /  TBili  0.7  /  DBili  x   /  AST  13  /  ALT  9<L>  /  AlkPhos  67  06-01          EKG: Personally reviewed by me - NSR  Radiology: Personally reviewed by me -     Xray Chest 1 View- PORTABLE-Urgent (Xray Chest 1 View- PORTABLE-Urgent .) (06.01.22 @ 02:55)  IMPRESSION:  Small left pleural effusion with left basilar atelectasis.    POCUS ED TTE 2D F/U, Limited w/o Cont. (06.01.22 @ 04:32)  INTERPRETATION:  A focused transthoracic cardiac ultrasound examination   was performed.  This was a limited exam.  No pericardial effusion was present.  RA appears prominent.  No global wall motion abnormality was identified.  A-line predominant bilateral lung fields.  IVC collapses with respiration.    IMPRESSION: No Pericardial Effusion.      Assessment /Plan:     Mr. Jordan is a 68 yo old male with PMH of active smoker, HTN, HLD and mediterranean anemia who presents with worsening abdominal pain. Cardiology consulted for concern of abnormal EKG. Patient denies chest pain, palpitations, edema, orthopnea, SOB or syncope. Does not have routine medical follow up. Has not had recent cardiology workup.     Problem/Plan -1  Problem:  - EKG with discreet cass in V1-V3 but not diagnostic for injury pattern  - Troponin negative x2  - Patient denies chest pain or shortness of breath.   - POCUS reveals no pericardial effusion and no global WMA  - CXR with small left pleural efusion with left basilar atelectasis.  - No indication for heparin gtt at this time  - c/w ASA 81 mg PO daily   - Obtain formal TTE  - Consider CTA coronaries once GI discomfort is fully evaluated    Problem/Plan -2  Problem: HTN  - c.w amlodipine 5mg PO daily    Problem/Plan -3  Problem: HLD  - Check lipid panel     Problem/Plan -4  Problem: DVT PPX  - c/w Hep SQ    Problem/Plan -5  Problem: Smoking Cessation  - Discussed with patient at bedside.  - Will continue to support and  patient on importance of smoking cessation.        Differential diagnosis and plan of care discussed with patient after the evaluation. Counseling on diet, nutritional counseling, weight management, exercise and medication compliance was done.   Advanced care planning/advanced directives discussed with patient/family. DNR status including forceful chest compressions to attempt to restart the heart, ventilator support/artificial breathing, electric shock, artificial nutrition, health care proxy, Molst form all discussed with pt. Pt wishes to consider. More than fifteen minutes spent on discussing advanced directives.     Gisela Troy DO [facility]  Cardiovascular Medicine  [address] [place] [address], [address]  Office [phone]  Cell [phone]

## 2022-06-01 NOTE — ED ADULT NURSE REASSESSMENT NOTE - NS ED NURSE REASSESS COMMENT FT1
Patient resting at bedside with family, no complaints at this time. IV running with no redness, drainage or swelling at site. No pain at this time, bed locked and in lowest position for safety.

## 2022-06-01 NOTE — H&P ADULT - HISTORY OF PRESENT ILLNESS
67M active smoker w/ PMH HLD, Mediterranean anemia p/w 2-wk h/o epigastric abdominal pain. Describes pain as sharp that started 2-weeks ago while doing some manual work at home. It is intermittent and usually last 30-40mins with peak intensity of 7/8 and has used advil with some relief. Of note, he presented to the ED a few days ago with same complaint with an unremarkable w/u including RUQ US and EKG that showed NSR with poor R-wave progression. Says that since discharge, the pain has progressively worsened and was so severe last night, he decided to come to the hospital for re-evaluation. He felt a bit nauseous but denied vomiting. He denied association with food,melena/hematochezia. Also denied Denied fever/chill, CP, SOB, dizziness.    ED course: Afebrile, BP elevated to 188/77. RR and O2 sats OK. EKG with CASS in Leads v1-v3,. Loaded with ASA 325mg and Started on Heparin gtt. Trops neg x1. Seen by cardiology

## 2022-06-01 NOTE — ED PROVIDER NOTE - OBJECTIVE STATEMENT
Patient is a 68 y/o gentlemen w/ pmhx of HLD, mediterranean anemia p/w epigastric for 2-3 weeks. Abdominal pain is intermittent sharp and episodes last for 1 hour at time occasionally with associated mild nausea. The pain is not related to activity/exertion or eating. He describes the pain as a sensation of bloating. He denies any chest pain, sob, ARTEAGA, vomiting, diarrhea or recent illness. He has never experienced this before.  He was seen on May 20th at which time his work up was unremarkable. RUQ sonogram was negative. EKG at that time NSR w/ poor R wave progression.     During assessment, patients EKG showed ST elevations in V2-V5 without reciprocal depression. Code STEMI called. Cardiology contacted immediately. Aspirin 325mg given stat. Atorvastatin 80mg ordered. Patient is a 66 y/o gentlemen w/ pmhx of HLD, mediterranean anemia p/w epigastric for 2-3 weeks. Abdominal pain is intermittent sharp and episodes last for 1 hour at time occasionally with associated mild nausea. The pain is not related to activity/exertion or eating. He describes the pain as a sensation of bloating. He denies any chest pain, sob, ARTEAGA, vomiting, diarrhea or recent illness. He has never experienced this before.  He was seen on May 20th at which time his work up was unremarkable. RUQ sonogram was negative. EKG at that time NSR w/ poor R wave progression. History of 40-50 pack years and active smoker.     During assessment, patients EKG showed ST elevations in V2-V5 without reciprocal depression. Code STEMI called. Cardiology contacted immediately. Aspirin 325mg given stat. Atorvastatin 80mg ordered.

## 2022-06-01 NOTE — PROGRESS NOTE ADULT - SUBJECTIVE AND OBJECTIVE BOX
Name of Patient : CHANDRIKA LIU  MRN: [id number]  Date of visit: 22 @ 18:29      Subjective: Patient seen and examined. No new events except as noted.   Patient seen in emergency department. Patient is S/P CT.  Denies active chest pain, palpitations. Denies abdominal pain or discomfort at time of visit.  Reports (and points to) intermittent epigastric pain. 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  Denies pain at time of visit.  Patient denies any recent weight loss or decrease in appetite. Denies bloody or dark stools.   Recent EGD/Colonoscopy 7-8 months ago per patient.     REVIEW OF SYSTEMS:    CONSTITUTIONAL: No weakness, fevers or chills  EYES/ENT: No visual changes;  No vertigo or throat pain   NECK: No pain or stiffness  RESPIRATORY: No cough, wheezing, hemoptysis; No shortness of breath  CARDIOVASCULAR: No chest pain or palpitations  GASTROINTESTINAL: No abdominal or epigastric pain. No nausea, vomiting, or hematemesis; No diarrhea or constipation. No melena or hematochezia.  GENITOURINARY: No dysuria, frequency or hematuria  NEUROLOGICAL: No numbness or weakness  SKIN: No itching, burning, rashes, or lesions   All other review of systems is negative unless indicated above.    MEDICATIONS:  MEDICATIONS  (STANDING):  amLODIPine   Tablet 5 milliGRAM(s) Oral daily  aspirin  chewable 81 milliGRAM(s) Oral daily  heparin   Injectable 5000 Unit(s) SubCutaneous every 8 hours  pantoprazole  Injectable 40 milliGRAM(s) IV Push two times a day      PHYSICAL EXAM:  T(C): 36.5 (22 @ 15:01), Max: 36.8 (22 @ 02:59)  HR: 55 (22 @ 15:01) (55 - 81)  BP: 146/79 (22 @ 15:01) (138/87 - 188/77)  RR: 18 (22 @ 15:01) (16 - 20)  SpO2: 96% (22 @ 15:01) (96% - 99%)  Wt(kg): --  I&O's Summary    Height (cm): 180.3 ( @ 21:02)  Weight (kg): 73.4 ( @ 02:24)  BMI (kg/m2): 22.6 ( @ 02:24)  BSA (m2): 1.93 ( @ 02:24)    Appearance: Normal	  HEENT:  PERRLA   Lymphatic: No lymphadenopathy   Cardiovascular: Normal S1 S2, no JVD  Respiratory: normal effort , clear  Gastrointestinal:  Soft, Non-tender  Skin: No rashes,  warm to touch  Psychiatry:  Mood & affect appropriate  Musculoskeletal: No edema                                11.5   8.28  )-----------( 244      ( 2022 09:00 )             35.5               06-    140  |  104  |  10  ----------------------------<  97  4.0   |  26  |  0.76    Ca    9.2      2022 02:34    TPro  6.8  /  Alb  4.0  /  TBili  0.7  /  DBili  x   /  AST  13  /  ALT  9<L>  /  AlkPhos  67  06-01    PT/INR - ( 2022 02:34 )   PT: 12.1 sec;   INR: 1.04 ratio         PTT - ( 2022 09:00 )  PTT:44.1 sec       CARDIAC MARKERS ( 2022 04:01 )  x     / x     / x     / x     / 1.9 ng/mL  CARDIAC MARKERS ( 2022 02:34 )  x     / x     / 38 U/L / x     / 2.1 ng/mL              Urinalysis Basic - ( 2022 03:04 )    Color: Colorless / Appearance: Clear / S.005 / pH: x  Gluc: x / Ketone: Negative  / Bili: Negative / Urobili: Negative   Blood: x / Protein: Negative / Nitrite: Negative   Leuk Esterase: Negative / RBC: x / WBC x   Sq Epi: x / Non Sq Epi: x / Bacteria: x          < from: Transthoracic Echocardiogram (22 @ 13:57) >  Patient name: CHANDRIKA LIU  YOB: 1955   Age: 67 (M)   MR#: [id number]  Study Date: 2022  [facility]  Study quality: Technically good  Referring Physician: Demarcus Plunkett MD  Blood Pressure: 141/87 mmHg  Height: 180 cm  Weight: 73 kg  BSA: 1.9 m2  Heart Rate: 57 mmHg  ------------------------------------------------------------------------  PROCEDURE: Transthoracic echocardiogram with 2-D, M-Mode  and complete spectral and color flow Doppler.  INDICATION: Chest pain, unspecified (R07.9)  ------------------------------------------------------------------------  Dimensions:    Normal Values:  LA:     3.1    2.0 - 4.0 cm  Ao:     3.1    2.0 - 3.8 cm  SEPTUM: 0.9    0.6 - 1.2 cm  PWT:    0.8    0.6 - 1.1 cm  LVIDd:  5.1    3.0 - 5.6 cm  LVIDs:  2.8    1.8 - 4.0 cm  Derived variables:  LVMI: 79 g/m2  RWT: 0.31  Fractional short: 45 %  EF (Long Rule): 71 %Doppler Peak Velocity (m/sec):  AoV=1.6  ------------------------------------------------------------------------  Observations:  Mitral Valve: Normal mitral valve. Minimal mitral  regurgitation.  Aortic Valve/Aorta: Calcified trileaflet aortic valve with  normal opening. Peak transaortic valve gradient equals 10  mm Hg, mean transaortic valve gradient equals 6 mm Hg,  aortic valve velocity time integral equals 30 cm, estimated  aortic valve area equals 2.1 sqcm. No aortic valve  regurgitation seen. Peak left ventricular outflow tract  gradient equals 6 mm Hg, mean gradient is equal to 3 mm Hg,  LVOT velocity time integral equals 24 cm.  Aortic Root: 3.1 cm.  Left Atrium: Normal left atrium.  LA volume index = 19  cc/m2.  Left Ventricle: Normal left ventricular systolic function.  No segmental wall motion abnormalities. Normal left  ventricular internal dimensions and wall thicknesses.  Normal diastolic function  Right Heart: Normal right atrium. Normal right ventricular  size and function. Normal tricuspid valve. Mild tricuspid  regurgitation. Normal pulmonic valve.Mild pulmonic  regurgitation.  Pericardium/Pleura: Normal pericardium with no pericardial  effusion.  Hemodynamic: Color Doppler demonstrates no evidence of a  patent foramen ovale.  ------------------------------------------------------------------------  Conclusions:  1. Normal mitral valve. Minimal mitral regurgitation.  2. Calcified trileaflet aortic valve with normal opening.  No aortic valve regurgitation seen.  3. Normal left ventricular systolic function. No segmental  wall motion abnormalities.  4. Normal diastolic function  5. Normal right ventricular size and function.  *** No previous Echo exam.  ------------------------------------------------------------------------  Confirmed on  2022 - 16:02:39 by Campbell Cheng M.D.  ------------------------------------    < end of copied text >        < from: CT Abdomen and Pelvis w/ IV Cont (22 @ 11:40) >  IMPRESSION:    Focal soft tissue thickening along the lesser curvature of the stomach   with central ulceration, concerning for gastric neoplasm. Correlate with   endoscopy.        --- End of Report ---    < end of copied text >

## 2022-06-01 NOTE — ED ADULT NURSE REASSESSMENT NOTE - NS ED NURSE REASSESS COMMENT FT1
Awake and alert. Reports abdominal pain non radiating 6/10. Medicated with Tylenol 650 mg po. Will monitor effect. On Heparin drip 900 units/hour. Denies chest pain. Will continue to monitor.

## 2022-06-01 NOTE — PATIENT PROFILE ADULT - FALL HARM RISK - HARM RISK INTERVENTIONS

## 2022-06-01 NOTE — PATIENT PROFILE ADULT - FUNCTIONAL ASSESSMENT - DAILY ACTIVITY 6.
Infusion complete. Pt tolerated infusion well. No S/S of infusion reaction noted at present time. One hour observation started.       4 = No assist / stand by assistance

## 2022-06-01 NOTE — ED PROVIDER NOTE - CARE PLAN
Principal Discharge DX:	Abdominal pain   1 Principal Discharge DX:	ST elevation MI (STEMI)  Secondary Diagnosis:	Abdominal pain  Secondary Diagnosis:	Abdominal bloating

## 2022-06-01 NOTE — ED ADULT NURSE NOTE - OBJECTIVE STATEMENT
67y M with PMHx of HLD presents to the ED with epigastric pain x1 week. Pt reports abd pain is described as a "sharp pain that comes and goes." Pt is also endorsing nausea and feels "bloated," not related to eating. As per pt, he was seen last week and workup was negative. Pt also states he smokes more than a pack/day. Upon assessment, pt is A&Ox3, breathing spontaneously, unlabored, speaking in full sentences. Able to move all extremities, able to follow commands. Skin is warm, dry and intact. Sensation intact. Pt denies cp, sob, fever, chills, dizziness, v/d. Placed on CM, NSR. Pt safety and comfort provided.

## 2022-06-01 NOTE — CONSULT NOTE ADULT - SUBJECTIVE AND OBJECTIVE BOX
CT abdomen reviewed. Concern for gastric neoplasm. Will plan for endoscopy.   Full note to follow.  Chief Complaint:  Patient is a 67y old  Male who presents with a chief complaint of abdominal pain (2022 15:15)      Date of service: 22 @ 16:05    HPI:    The patient is a 67 year old male active smoker with history of HLD, Mediterranean anemia presenting with 2 weeks of epigastric abdominal pain. 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 Describes as intermittent and sharp. Presented to ED 22 for same complaint; abdominal US with no acute pathology. Was started on famotidine however which is not relieving pain. States he has been using Advil for pain control and also eats spicy foods. History of egd/colonoscopy with Dr. Maloney; told he has a "bacteria" and was given antibiotics. Last BM yesterday which was normal. Patient denies nausea, vomiting, dysphagia, diarrhea, changes in bowel habits.       Allergies:  No Known Allergies      Home Medications:    Hospital Medications:  acetaminophen     Tablet .. 650 milliGRAM(s) Oral every 6 hours PRN  aluminum hydroxide/magnesium hydroxide/simethicone Suspension 30 milliLiter(s) Oral every 4 hours PRN  amLODIPine   Tablet 5 milliGRAM(s) Oral daily  aspirin  chewable 81 milliGRAM(s) Oral daily  famotidine    Tablet 20 milliGRAM(s) Oral two times a day  heparin   Injectable 5000 Unit(s) SubCutaneous every 8 hours  melatonin 3 milliGRAM(s) Oral at bedtime PRN  ondansetron Injectable 4 milliGRAM(s) IV Push every 8 hours PRN      PMHX/PSHX:  Hypercholesterolemia    Mediterranean anemia    S/P hernia surgery        Family history:  No pertinent family history in first degree relatives        Social History:   Denies ethanol use.  Denies illicit drug use.    ROS:     General:  No wt loss, fevers, chills, night sweats, fatigue,   Eyes:  Good vision, no reported pain  ENT:  No sore throat, pain, runny nose, dysphagia  CV:  No pain, palpitations, hypo/hypertension  Resp:  No dyspnea, cough, tachypnea, wheezing  GI:  See HPI  :  No pain, bleeding, incontinence, nocturia  Muscle:  No pain, weakness  Neuro:  No weakness, tingling, memory problems  Psych:  No fatigue, insomnia, mood problems, depression  Endocrine:  No polyuria, polydipsia, cold/heat intolerance  Heme:  No petechiae, ecchymosis, easy bruisability  Integumentary:  No rash, edema      PHYSICAL EXAM:     GENERAL:  Appears stated age, well-groomed, well-nourished, no distress  HEENT:  NC/AT,  conjunctivae anicteric, clear and pink,   NECK: supple, trachea midline  CHEST:  Full & symmetric excursion, no increased effort, breath sounds clear  HEART:  Regular rhythm, no JVD  ABDOMEN:  Soft, non-tender, non-distended, normoactive bowel sounds,  no masses , no hepatosplenomegaly  EXTREMITIES:  no cyanosis,clubbing or edema  SKIN:  No rash, erythema, or, ecchymoses, no jaundice  NEURO:  Alert, non-focal, no asterixis  PSYCH: Appropriate affect, oriented to place and time  RECTAL: Deferred      Vital Signs:  Vital Signs Last 24 Hrs  T(C): 36.5 (2022 15:01), Max: 36.8 (2022 02:59)  T(F): 97.7 (2022 15:01), Max: 98.3 (2022 02:59)  HR: 55 (2022 15:01) (55 - 81)  BP: 146/79 (2022 15:01) (138/87 - 188/77)  BP(mean): 102 (2022 04:22) (102 - 102)  RR: 18 (2022 15:01) (16 - 20)  SpO2: 96% (2022 15:01) (96% - 99%)  Daily Height in cm: 180.34 (31 May 2022 21:02)    Daily     LABS: Labs personally reviewed by me:                        11.5   8.28  )-----------( 244      ( 2022 09:00 )             35.5     06-01    140  |  104  |  10  ----------------------------<  97  4.0   |  26  |  0.76    Ca    9.2      2022 02:34    TPro  6.8  /  Alb  4.0  /  TBili  0.7  /  DBili  x   /  AST  13  /  ALT  9<L>  /  AlkPhos  67  06-01    LIVER FUNCTIONS - ( 2022 02:34 )  Alb: 4.0 g/dL / Pro: 6.8 g/dL / ALK PHOS: 67 U/L / ALT: 9 U/L / AST: 13 U/L / GGT: x           PT/INR - ( 2022 02:34 )   PT: 12.1 sec;   INR: 1.04 ratio         PTT - ( 2022 09:00 )  PTT:44.1 sec  Urinalysis Basic - ( 2022 03:04 )    Color: Colorless / Appearance: Clear / S.005 / pH: x  Gluc: x / Ketone: Negative  / Bili: Negative / Urobili: Negative   Blood: x / Protein: Negative / Nitrite: Negative   Leuk Esterase: Negative / RBC: x / WBC x   Sq Epi: x / Non Sq Epi: x / Bacteria: x      Amylase Serum--      Lipase serum18       Ammonia--      Imaging personally reviewed by me:

## 2022-06-01 NOTE — PROGRESS NOTE ADULT - ASSESSMENT
67M active smoker w/ PMH HLD, Mediterranean anemia p/w 2-wk h/o epigastric abdominal pain.     Abdominal pain.   - PT with extensive smoking history(80-pack-year) and HTN not on meds  - lipid panel unremarkable   - A1C of 5.3   - CT A/P w/ IV Contrast noted - GI eval called  - Pt reports recent [place]/EGD 7-8 months ago WNL   - D/C famotidine, start PPI IV BID per GI     Abnormal EKG  - Given CASS in Ant leads, c/f ACS however trops neg x2  - s/p ASA loading. S/P hep gtt  possible ACS -- ruled out   - evaluated by cards in ED and not impressed ACS, recommending repeat trops x3 and EKG    - Was on Hep Gtt, now D/C  - Appreciate cardio recs  - TTE as above with EF of 71%, Minimal MR, Normal LB systolic function, no WMA, normal diastolic function   - Possible CTA per cardio     HLD (hyperlipidemia).   - Lipid panel noted   - C/w atorvastatin 20mg.    HTN (hypertension).   -uncontrolled and pt on no home meds  -will start on low-dose norvasc.  - Monitor BP, VS and patient closely  - adjust as tolerated      Anemia.   - Due to history of mediterranean anemia  -maintain active T&S  -transfuse Hb<7.    Smoking cessation  - C/w Encouragement towards smoking cessation     Prophylactic measure.    -Diet: DASH/TLC  - Heparin DVT PPX dose

## 2022-06-01 NOTE — ED ADULT NURSE NOTE - SUICIDE SCREENING QUESTION 3
Problem: Patient Care Overview (Adult)  Goal: Plan of Care Review  Outcome: Ongoing (interventions implemented as appropriate)    08/16/17 0350   Coping/Psychosocial Response Interventions   Plan Of Care Reviewed With patient;family   Patient Care Overview   Progress improving   Outcome Evaluation   Outcome Summary/Follow up Plan Pt alert and oriented. Bumex drip stopped at 2100. Pt received one dose of pain medication at the beginning of the night, no further complaints.            No

## 2022-06-01 NOTE — ED PROVIDER NOTE - NS ED ROS FT
REVIEW OF SYSTEMS:    CONSTITUTIONAL: No weakness, fevers or chills  EYES/ENT: No visual changes;  No vertigo or throat pain   NECK: No pain or stiffness  RESPIRATORY: No cough, wheezing, hemoptysis; No shortness of breath  CARDIOVASCULAR: No chest pain or palpitations  GASTROINTESTINAL: + abdominal pain. + nausea, No vomiting, or hematemesis; No diarrhea or constipation. No melena or hematochezia.  GENITOURINARY: No dysuria, frequency or hematuria  NEUROLOGICAL: No numbness or weakness  SKIN: No itching, burning, rashes, or lesions   All other review of systems is negative unless indicated above.

## 2022-06-01 NOTE — H&P ADULT - NSHPLABSRESULTS_GEN_ALL_CORE
11.6   10.21 )-----------( 249      ( 2022 02:34 )             35.3       06-01    140  |  104  |  10  ----------------------------<  97  4.0   |  26  |  0.76    Ca    9.2      2022 02:34    TPro  6.8  /  Alb  4.0  /  TBili  0.7  /  DBili  x   /  AST  13  /  ALT  9<L>  /  AlkPhos  67  06-01      CARDIAC MARKERS ( 2022 04:01 )  x     / x     / x     / x     / 1.9 ng/mL  CARDIAC MARKERS ( 2022 02:34 )  x     / x     / 38 U/L / x     / 2.1 ng/mL        LIVER FUNCTIONS - ( 2022 02:34 )  Alb: 4.0 g/dL / Pro: 6.8 g/dL / ALK PHOS: 67 U/L / ALT: 9 U/L / AST: 13 U/L / GGT: x             PT/INR - ( 2022 02:34 )   PT: 12.1 sec;   INR: 1.04 ratio         PTT - ( 2022 02:34 )  PTT:27.3 sec    Urinalysis Basic - ( 2022 03:04 )    Color: Colorless / Appearance: Clear / S.005 / pH: x  Gluc: x / Ketone: Negative  / Bili: Negative / Urobili: Negative   Blood: x / Protein: Negative / Nitrite: Negative   Leuk Esterase: Negative / RBC: x / WBC x   Sq Epi: x / Non Sq Epi: x / Bacteria: x      I have personally reviewed imaging, clear lungs  I have personally reviewed EKG, CASS seen in leads v1-v3

## 2022-06-01 NOTE — H&P ADULT - NSHPPHYSICALEXAM_GEN_ALL_CORE
Vital Signs Last 24 Hrs  T(C): 36.8 (01 Jun 2022 02:59), Max: 36.8 (01 Jun 2022 02:59)  T(F): 98.3 (01 Jun 2022 02:59), Max: 98.3 (01 Jun 2022 02:59)  HR: 65 (01 Jun 2022 06:31) (64 - 81)  BP: 148/86 (01 Jun 2022 06:31) (138/87 - 188/77)  BP(mean): 102 (01 Jun 2022 04:22) (102 - 102)  RR: 16 (01 Jun 2022 06:31) (16 - 20)  SpO2: 96% (01 Jun 2022 06:31) (96% - 99%)

## 2022-06-02 ENCOUNTER — RESULT REVIEW (OUTPATIENT)
Age: 67
End: 2022-06-02

## 2022-06-02 LAB
CULTURE RESULTS: NO GROWTH — SIGNIFICANT CHANGE UP
HCT VFR BLD CALC: 34.6 % — LOW (ref 39–50)
HCV AB S/CO SERPL IA: 0.08 S/CO — SIGNIFICANT CHANGE UP (ref 0–0.99)
HCV AB SERPL-IMP: SIGNIFICANT CHANGE UP
HGB BLD-MCNC: 11.1 G/DL — LOW (ref 13–17)
MCHC RBC-ENTMCNC: 21.8 PG — LOW (ref 27–34)
MCHC RBC-ENTMCNC: 32.1 GM/DL — SIGNIFICANT CHANGE UP (ref 32–36)
MCV RBC AUTO: 67.8 FL — LOW (ref 80–100)
NRBC # BLD: 0 /100 WBCS — SIGNIFICANT CHANGE UP (ref 0–0)
PLATELET # BLD AUTO: 231 K/UL — SIGNIFICANT CHANGE UP (ref 150–400)
RBC # BLD: 5.1 M/UL — SIGNIFICANT CHANGE UP (ref 4.2–5.8)
RBC # FLD: 15.4 % — HIGH (ref 10.3–14.5)
SPECIMEN SOURCE: SIGNIFICANT CHANGE UP
TROPONIN T, HIGH SENSITIVITY RESULT: <6 NG/L — SIGNIFICANT CHANGE UP (ref 0–51)
WBC # BLD: 7.33 K/UL — SIGNIFICANT CHANGE UP (ref 3.8–10.5)
WBC # FLD AUTO: 7.33 K/UL — SIGNIFICANT CHANGE UP (ref 3.8–10.5)

## 2022-06-02 PROCEDURE — 88341 IMHCHEM/IMCYTCHM EA ADD ANTB: CPT | Mod: 26,59

## 2022-06-02 PROCEDURE — 88305 TISSUE EXAM BY PATHOLOGIST: CPT | Mod: 26

## 2022-06-02 PROCEDURE — 88360 TUMOR IMMUNOHISTOCHEM/MANUAL: CPT | Mod: 26

## 2022-06-02 PROCEDURE — 99223 1ST HOSP IP/OBS HIGH 75: CPT

## 2022-06-02 PROCEDURE — 88342 IMHCHEM/IMCYTCHM 1ST ANTB: CPT | Mod: 26

## 2022-06-02 PROCEDURE — 88365 INSITU HYBRIDIZATION (FISH): CPT | Mod: 26

## 2022-06-02 RX ORDER — LIDOCAINE HCL 20 MG/ML
4 VIAL (ML) INJECTION ONCE
Refills: 0 | Status: DISCONTINUED | OUTPATIENT
Start: 2022-06-02 | End: 2022-06-03

## 2022-06-02 RX ORDER — SODIUM CHLORIDE 9 MG/ML
500 INJECTION INTRAMUSCULAR; INTRAVENOUS; SUBCUTANEOUS
Refills: 0 | Status: DISCONTINUED | OUTPATIENT
Start: 2022-06-02 | End: 2022-06-03

## 2022-06-02 RX ADMIN — Medication 81 MILLIGRAM(S): at 11:48

## 2022-06-02 RX ADMIN — PANTOPRAZOLE SODIUM 40 MILLIGRAM(S): 20 TABLET, DELAYED RELEASE ORAL at 05:35

## 2022-06-02 RX ADMIN — HEPARIN SODIUM 5000 UNIT(S): 5000 INJECTION INTRAVENOUS; SUBCUTANEOUS at 01:03

## 2022-06-02 RX ADMIN — HEPARIN SODIUM 5000 UNIT(S): 5000 INJECTION INTRAVENOUS; SUBCUTANEOUS at 23:43

## 2022-06-02 RX ADMIN — PANTOPRAZOLE SODIUM 40 MILLIGRAM(S): 20 TABLET, DELAYED RELEASE ORAL at 17:42

## 2022-06-02 RX ADMIN — HEPARIN SODIUM 5000 UNIT(S): 5000 INJECTION INTRAVENOUS; SUBCUTANEOUS at 17:43

## 2022-06-02 RX ADMIN — AMLODIPINE BESYLATE 5 MILLIGRAM(S): 2.5 TABLET ORAL at 05:35

## 2022-06-02 RX ADMIN — HEPARIN SODIUM 5000 UNIT(S): 5000 INJECTION INTRAVENOUS; SUBCUTANEOUS at 08:17

## 2022-06-02 NOTE — CONSULT NOTE ADULT - ATTENDING COMMENTS
Large malignant appearing lesser curvature mass.  Concern for malignancy.  Await EGD biopsy results to direct workup and treatment.  CT chest to complete initial staging.  Diagnostic/treatment algorithm discussed in detail with the patient and his daughter.  Will follow closely.  Thank you.

## 2022-06-02 NOTE — PROGRESS NOTE ADULT - ASSESSMENT
67M active smoker w/ PMH HLD, Mediterranean anemia p/w 2-wk h/o epigastric abdominal pain.     Abdominal pain.   - PT with extensive smoking history (80-pack-year) and HTN not on meds  - lipid panel unremarkable   - A1C of 5.3   - CT A/P w/ IV Contrast noted - GI eval called  - Pt reports recent [place]/EGD 7-8 months ago WNL, was treated for a "bacteria" (does not recall if H. pylori)  - Cont with PPI IV BID   - S/P EGD with Malignant appearing gastric ulcer; F/U pathology - Discussed with patient  - Checking CT Chest non-cont  - Surg/Onc - Dr. Thornton called for evaluation       Abnormal EKG  - Given CASS in Ant leads, c/f ACS however trops neg x3  - s/p ASA loading. S/P hep gtt  possible ACS -- ruled out   - evaluated by cards in ED and not impressed ACS, recommending repeat trops x3 and EKG    - Was on Hep Gtt, now D/C  - Appreciate cardio recs  - TTE as above with EF of 71%, Minimal MR, Normal LB systolic function, no WMA, normal diastolic function   - Possible CTA per cardio     HLD (hyperlipidemia).   - Lipid panel noted   - C/w atorvastatin 20mg.    HTN (hypertension).   -uncontrolled and pt on no home meds  -will start on low-dose norvasc.  - Monitor BP, VS and patient closely  - adjust as tolerated      Anemia.   - Due to history of mediterranean anemia  -maintain active T&S  -transfuse Hb<7.    Smoking cessation  - C/w Encouragement towards smoking cessation   - Discussed with patient at length     Prophylactic measure.    -Diet: DASH/TLC  - Heparin DVT PPX dose      67M active smoker w/ PMH HLD, Mediterranean anemia p/w 2-wk h/o epigastric abdominal pain.     Abdominal pain.   - PT with extensive smoking history (80-pack-year) and HTN not on meds  - lipid panel unremarkable   - A1C of 5.3   - CT A/P w/ IV Contrast noted - GI eval called  - Pt reports recent [place]/EGD 7-8 months ago WNL, was treated for a "bacteria" (does not recall if H. pylori)  - Cont with PPI IV BID   - S/P EGD with Malignant appearing gastric ulcer; F/U pathology - Discussed with patient  - Checking CT Chest non-cont  - Surg/Onc - Dr. Thornton called for evaluation   - Checking tumor markers - CEA and CA 19-9    Abnormal EKG  - Given CASS in Ant leads, c/f ACS however trops neg x3  - s/p ASA loading. S/P hep gtt  possible ACS -- ruled out   - evaluated by cards in ED and not impressed ACS, recommending repeat trops x3 and EKG    - Was on Hep Gtt, now D/C  - Appreciate cardio recs  - TTE as above with EF of 71%, Minimal MR, Normal LB systolic function, no WMA, normal diastolic function   - Possible CTA per cardio     HLD (hyperlipidemia).   - Lipid panel noted   - C/w atorvastatin 20mg.    HTN (hypertension).   -uncontrolled and pt on no home meds  -will start on low-dose norvasc.  - Monitor BP, VS and patient closely  - adjust as tolerated      Anemia.   - Due to history of mediterranean anemia  -maintain active T&S  -transfuse Hb<7.    Smoking cessation  - C/w Encouragement towards smoking cessation   - Discussed with patient at length     Prophylactic measure.    -Diet: DASH/TLC  - Heparin DVT PPX dose

## 2022-06-02 NOTE — PROGRESS NOTE ADULT - SUBJECTIVE AND OBJECTIVE BOX
Name of Patient : CHANDRIKA LUI  MRN: [id number]  Date of visit: 22 @ 13:42      Subjective: Patient seen and examined. No new events except as noted.   Patient seen S/P EGD today.   Denies CP, palpitations, SOB or dyspnea.    REVIEW OF SYSTEMS:    CONSTITUTIONAL: No weakness, fevers or chills  EYES/ENT: No visual changes;  No vertigo or throat pain   NECK: No pain or stiffness  RESPIRATORY: No cough, wheezing, hemoptysis; No shortness of breath  CARDIOVASCULAR: No chest pain or palpitations  GASTROINTESTINAL: No abdominal or epigastric pain. No nausea, vomiting, or hematemesis; No diarrhea or constipation. No melena or hematochezia.  GENITOURINARY: No dysuria, frequency or hematuria  NEUROLOGICAL: No numbness or weakness  SKIN: No itching, burning, rashes, or lesions   All other review of systems is negative unless indicated above.    MEDICATIONS:  MEDICATIONS  (STANDING):  amLODIPine   Tablet 5 milliGRAM(s) Oral daily  aspirin  chewable 81 milliGRAM(s) Oral daily  heparin   Injectable 5000 Unit(s) SubCutaneous every 8 hours  influenza  Vaccine (HIGH DOSE) 0.7 milliLiter(s) IntraMuscular once  lidocaine 4% Injection for Nebulization 4 milliLiter(s) Nebulizer once  pantoprazole  Injectable 40 milliGRAM(s) IV Push two times a day  sodium chloride 0.9%. 500 milliLiter(s) (30 mL/Hr) IV Continuous <Continuous>      PHYSICAL EXAM:  T(C): 36.7 (22 @ 11:24), Max: 36.7 (22 @ 21:43)  HR: 62 (22 @ 11:24) (55 - 85)  BP: 131/77 (22 @ 11:24) (120/72 - 146/79)  RR: 18 (22 @ 11:24) (15 - 18)  SpO2: 95% (22 @ 11:24) (94% - 99%)  Wt(kg): --  I&O's Summary    2022 07:01  -  2022 07:00  --------------------------------------------------------  IN: 240 mL / OUT: 0 mL / NET: 240 mL    2022 07:  -  2022 13:42  --------------------------------------------------------  IN: 0 mL / OUT: 0 mL / NET: 0 mL      Height (cm): 180.3 ( @ :08)  Weight (kg): 73.4 (:08)  BMI (kg/m2): 22.6 (:08)  BSA (m2): 1.93 (:08)    Appearance: Normal	  HEENT:  PERRLA   Lymphatic: No lymphadenopathy   Cardiovascular: Normal S1 S2, no JVD  Respiratory: normal effort , clear  Gastrointestinal:  Soft, Non-tender  Skin: No rashes,  warm to touch  Psychiatry:  Mood & affect appropriate  Musculoskeletal: No edema      22 @ 07:01  -  22 @ 07:00  --------------------------------------------------------  IN: 240 mL / OUT: 0 mL / NET: 240 mL    22 @ 07:01  -  22 @ 13:42  --------------------------------------------------------  IN: 0 mL / OUT: 0 mL / NET: 0 mL                                11.1   7.33  )-----------( 231      ( 2022 06:48 )             34.6                   140  |  104  |  10  ----------------------------<  97  4.0   |  26  |  0.76    Ca    9.2      2022 02:34    TPro  6.8  /  Alb  4.0  /  TBili  0.7  /  DBili  x   /  AST  13  /  ALT  9<L>  /  AlkPhos  67  06-01    PT/INR - ( 2022 02:34 )   PT: 12.1 sec;   INR: 1.04 ratio         PTT - ( 2022 09:00 )  PTT:44.1 sec       CARDIAC MARKERS ( 2022 04:01 )  x     / x     / x     / x     / 1.9 ng/mL  CARDIAC MARKERS ( 2022 02:34 )  x     / x     / 38 U/L / x     / 2.1 ng/mL              Urinalysis Basic - ( 2022 03:04 )    Color: Colorless / Appearance: Clear / S.005 / pH: x  Gluc: x / Ketone: Negative  / Bili: Negative / Urobili: Negative   Blood: x / Protein: Negative / Nitrite: Negative   Leuk Esterase: Negative / RBC: x / WBC x   Sq Epi: x / Non Sq Epi: x / Bacteria: x      < from: Upper Endoscopy (22 @ 08:36) >  Findings:       The examined esophagus was normal.       One massive (10cm) cratered gastric ulcer with heaped edges was found at the incisura.        Biopsies were taken with a cold forceps for histology.       The exam of the stomach was otherwise normal. Biopsies were taken from the normal mucosa for        H pylori.       The examined duodenum was normal.                                   Impression:          - Malignant appearing gastric ulcer. Differential diagnosis includes                        adenocarcinoma, lymphoma.  Recommendation:      - Await pathology results.                       - PPI BID                       - Oncology evaluation                       - [place] CT    < end of copied text >

## 2022-06-02 NOTE — PRE-OP CHECKLIST - BP NONINVASIVE SYSTOLIC (MM HG)
PD HPI MHE





- Stated complaint


Stated Complaint: SI





- Chief complaint


Chief Complaint: MHE





- History obtained from


History obtained from: Patient





- History of Present Illness


Primary symptom: Other (She has long-standing depression which is never really 

been treated, most of the issues related around her mother, but also release in 

long-term relationship and move up from [place] after that collapsed.  She 

feels fairly hopeless, unable to keep down a job.  She has some vague suicidal 

ideation without specific plan.  She also has been drinking alcohol.)





Review of Systems


Ten Systems: 10 systems reviewed and negative


Constitutional: denies: Fever, Chills


Nose: denies: Rhinorrhea / runny nose, Congestion


Throat: denies: Dental pain / toothache, Sore throat


Cardiac: denies: Chest pain / pressure, Palpitations


Respiratory: denies: Dyspnea, Cough





PD PAST MEDICAL HISTORY





- Past Medical History


Past Medical History: Yes


Cardiovascular: Other


Respiratory: None





- Past Surgical History


Past Surgical History: No





- Present Medications


Home Medications: 


 Ambulatory Orders











 Medication  Instructions  Recorded  Confirmed


 


Cephalexin [Keflex] 500 mg PO Q6H #40 capsule 07/25/17 


 


HYDROcod/ACETAM 5/325 [Norco 5/325] 1 ea PO Q6H PRN #10 tablet 07/25/17 


 


Ibuprofen [Motrin] 400 mg PO Q6H PRN #30 tablet 07/25/17 


 


Ondansetron Odt [Zofran] 4 mg TL Q6H PRN #10 tablet 07/25/17 














- Allergies


Allergies/Adverse Reactions: 


 Allergies











Allergy/AdvReac Type Severity Reaction Status Date / Time


 


No Known Drug Allergies Allergy   Verified 03/13/17 04:19














- Social History


Does the pt smoke?: Yes


Smoking Status: Current every day smoker


Does the pt drink ETOH?: Yes


Does the pt have substance abuse?: No





- Family History


Family history: reports: Non contributory





- Immunizations


Immunizations are current?: Yes





- POLST


Patient has POLST: No





PD ED PE NORMAL





- Vitals


Vital signs reviewed: Yes





- General


General: Alert and oriented X 3, No acute distress, Other (Sometimes tearful 

but appropriate and cogent, good historian)





- HEENT


HEENT: PERRL, EOMI





- Neck


Neck: Supple, no meningeal sign, No bony TTP





- Cardiac


Cardiac: RRR, No murmur





- Respiratory


Respiratory: No respiratory distress, Clear bilaterally





- Abdomen


Abdomen: Soft, Non tender





- Back


Back: No CVA TTP, No spinal TTP





- Derm


Derm: Normal color, Warm and dry





- Extremities


Extremities: No edema, No calf tenderness / cord





- Neuro


Neuro: Alert and oriented X 3, Normal speech





- Psych


Psych: Normal mood, Normal affect





Results





- Vitals


Vitals: 


 Vital Signs - 24 hr











  08/25/17





  13:50


 


Temperature 36.9 C


 


Heart Rate 85


 


Respiratory 16





Rate 


 


Blood Pressure 136/86 H


 


O2 Saturation 97








 Oxygen











O2 Source                      Room air

















- Labs


Labs: 


 Laboratory Tests











  08/25/17 08/25/17 08/25/17





  13:50 14:27 14:27


 


WBC   6.1 


 


RBC   4.26 


 


Hgb   14.3 


 


Hct   41.4 


 


MCV   97.2 


 


MCH   33.6 H 


 


MCHC   34.6 


 


RDW   12.5 


 


Plt Count   184 


 


MPV   9.0 


 


Neut #   3.4 


 


Lymph #   2.2 


 


Mono #   0.3 


 


Eos #   0.1 


 


Baso #   0.1 


 


Absolute Nucleated RBC   0.00 


 


Nucleated RBCs   0.0 


 


Sodium    142


 


Potassium    3.7


 


Chloride    108


 


Carbon Dioxide    24


 


Anion Gap    10.0


 


BUN    10


 


Creatinine    0.6


 


Estimated GFR (MDRD)    123


 


Glucose    94


 


Calcium    9.2


 


Total Bilirubin    0.9


 


AST    19


 


ALT    16


 


Alkaline Phosphatase    68


 


Total Protein    8.1


 


Albumin    4.6


 


Globulin    3.5


 


Albumin/Globulin Ratio    1.3


 


Lipase    25


 


Urine Color  YELLOW  


 


Urine Clarity  CLEAR  


 


Urine pH  6.0  


 


Ur Specific Gravity  <=1.005  


 


Urine Protein  NEGATIVE  


 


Urine Glucose (UA)  NEGATIVE  


 


Urine Ketones  NEGATIVE  


 


Urine Occult Blood  NEGATIVE  


 


Urine Nitrite  NEGATIVE  


 


Urine Bilirubin  NEGATIVE  


 


Urine Urobilinogen  0.2 (NORMAL)  


 


Ur Leukocyte Esterase  NEGATIVE  


 


Ur Microscopic Review  NOT INDICATED  


 


Urine Culture Comments  NOT INDICATED  


 


Urine HCG, Qual  NEGATIVE  


 


Ethyl Alcohol    238.6














PD MEDICAL DECISION MAKING





- ED course


ED course: 





25-year-old woman with long-standing depression, vague thoughts of self-harm 

without specific plan.  She is noted to be somewhat intoxicated although does 

not appear clinically so.  Seen by the  and resources given for 

outpatient treatment.





Departure





- Departure


Disposition: 01 Home, Self Care


Clinical Impression: 


Depression


Qualifiers:


 Depression Type: major depressive disorder Major depression recurrence: 

recurrent Active/Remission status: currently active Major depression episode 

severity: severe Psychotic features: without psychotic features Qualified Code(s

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 

features





Alcoholic intoxication


Qualifiers:


 Complication of substance-induced condition: uncomplicated Qualified Code(s): 

F10.120 - Alcohol abuse with intoxication, uncomplicated


Condition: Stable


Record reviewed to determine appropriate education?: Yes


Instructions:  ED Alcohol Intoxication, ED Depression


Comments: 


Follow the instructions of the  regarding outpatient treatment.  

Return anytime if worse.  Try to decrease your use of alcohol, remember that it 

does contribute to depression.





Your blood pressure was elevated today on check into the emergency department.  

This does not mean that you have hypertension, it is a common phenomenon to 

come to the emergency department and have elevated blood pressure.  I recommend 

that she see her primary care physician within the week to have it rechecked 

when you are feeling better.
131

## 2022-06-02 NOTE — CONSULT NOTE ADULT - CONSULT REASON
Abdominal discomfort, Abnormal EKG
Gastric mass
abnormal CT abdomen, concern for neoplasm
concern for STEMI

## 2022-06-02 NOTE — PROGRESS NOTE ADULT - ASSESSMENT
The patient is a 67 year old male active smoker with history of HLD, Mediterranean anemia presenting with 2 weeks of epigastric abdominal pain.    1. Epigastric pain  -CT abdomen reviewed; concerning for gastric ulcer vs neoplasm  -s/p endoscopy this morning: malignant appearing gastric ulcer. DDx includes adenocarcinoma, lymphoma  -biopsies taken, pending path  -PPI BID  -[facility] oncology consult  -[facility] pan CT     2. abnormal EKG  -per cards  -pending CTA coronaries     3. HTN      Advanced care planning forms were discussed. Code status including forceful chest compressions, defibrillation and intubation were discussed. The risks benefits and alternatives to pertinent gastrointestinal procedures and interventions were discussed in detail and all questions were answered. Duration: 15 Minutes.  Attending supervision statement: I have personally seen and examined the patient. I fully participated in the care of this patient. I have made amendments to the documentation where necessary, and agree with the history, physical exam, and plan as outlined by the ACP.    [facility]   Gastroenterology and Hepatology  [address], [place]  Office: [phone]  Cell: [phone]

## 2022-06-02 NOTE — PROGRESS NOTE ADULT - SUBJECTIVE AND OBJECTIVE BOX
Chief Complaint:  Patient is a 67y old  Male who presents with a chief complaint of abdominal pain (2022 18:29)      Date of service 22 @ 13:07      Interval Events:   Patient seen and examined.   s/p endoscopy today    Hospital Medications:  acetaminophen     Tablet .. 650 milliGRAM(s) Oral every 6 hours PRN  aluminum hydroxide/magnesium hydroxide/simethicone Suspension 30 milliLiter(s) Oral every 4 hours PRN  amLODIPine   Tablet 5 milliGRAM(s) Oral daily  aspirin  chewable 81 milliGRAM(s) Oral daily  heparin   Injectable 5000 Unit(s) SubCutaneous every 8 hours  influenza  Vaccine (HIGH DOSE) 0.7 milliLiter(s) IntraMuscular once  lidocaine 4% Injection for Nebulization 4 milliLiter(s) Nebulizer once  melatonin 3 milliGRAM(s) Oral at bedtime PRN  ondansetron Injectable 4 milliGRAM(s) IV Push every 8 hours PRN  pantoprazole  Injectable 40 milliGRAM(s) IV Push two times a day  sodium chloride 0.9%. 500 milliLiter(s) IV Continuous <Continuous>        Review of Systems:  General:  No wt loss, fevers, chills, night sweats, fatigue,   Eyes:  Good vision, no reported pain  ENT:  No sore throat, pain, runny nose, dysphagia  CV:  No pain, palpitations, hypo/hypertension  Resp:  No dyspnea, cough, tachypnea, wheezing  GI:  See HPI  :  No pain, bleeding, incontinence, nocturia  Muscle:  No pain, weakness  Neuro:  No weakness, tingling, memory problems  Psych:  No fatigue, insomnia, mood problems, depression  Endocrine:  No polyuria, polydipsia, cold/heat intolerance  Heme:  No petechiae, ecchymosis, easy bruisability  Integumentary:  No rash, edema    PHYSICAL EXAM:   Vital Signs:  Vital Signs Last 24 Hrs  T(C): 36.7 (2022 11:24), Max: 36.7 (2022 21:43)  T(F): 98.1 (2022 11:24), Max: 98.1 (2022 05:25)  HR: 62 (2022 11:24) (55 - 85)  BP: 131/77 (2022 11:24) (120/72 - 146/79)  BP(mean): --  RR: 18 (2022 11:24) (15 - 18)  SpO2: 95% (2022 11:24) (94% - 99%)  Daily Height in cm: 180.34 (2022 09:08)    Daily       PHYSICAL EXAM:     GENERAL:  Appears stated age, well-groomed, well-nourished, no distress  HEENT:  NC/AT,  conjunctivae anicteric, clear and pink,   NECK: supple, trachea midline  CHEST:  Full & symmetric excursion, no increased effort, breath sounds clear  HEART:  Regular rhythm, no JVD  ABDOMEN:  Soft, non-tender, non-distended, normoactive bowel sounds,  no masses , no hepatosplenomegaly  EXTREMITIES:  no cyanosis,clubbing or edema  SKIN:  No rash, erythema, or, ecchymoses, no jaundice  NEURO:  Alert, non-focal, no asterixis  PSYCH: Appropriate affect, oriented to place and time  RECTAL: Deferred      LABS Personally reviewed by me:                        11.1   7.33  )-----------( 231      ( 2022 06:48 )             34.6     Mean Cell Volume: 67.8 fl (22 @ 06:48)    06-01    140  |  104  |  10  ----------------------------<  97  4.0   |  26  |  0.76    Ca    9.2      2022 02:34    TPro  6.8  /  Alb  4.0  /  TBili  0.7  /  DBili  x   /  AST  13  /  ALT  9<L>  /  AlkPhos  67  06-01    LIVER FUNCTIONS - ( 2022 02:34 )  Alb: 4.0 g/dL / Pro: 6.8 g/dL / ALK PHOS: 67 U/L / ALT: 9 U/L / AST: 13 U/L / GGT: x           PT/INR - ( 2022 02:34 )   PT: 12.1 sec;   INR: 1.04 ratio         PTT - ( 2022 09:00 )  PTT:44.1 sec  Urinalysis Basic - ( 2022 03:04 )    Color: Colorless / Appearance: Clear / S.005 / pH: x  Gluc: x / Ketone: Negative  / Bili: Negative / Urobili: Negative   Blood: x / Protein: Negative / Nitrite: Negative   Leuk Esterase: Negative / RBC: x / WBC x   Sq Epi: x / Non Sq Epi: x / Bacteria: x                              11.1   7.33  )-----------( 231      ( 2022 06:48 )             34.6                         11.5   8.28  )-----------( 244      ( 2022 09:00 )             35.5                         11.6   10.21 )-----------( 249      ( 2022 02:34 )             35.3       Imaging personally reviewed by me:             Chief Complaint:  Patient is a 67y old  Male who presents with a chief complaint of abdominal pain (2022 18:29)      Date of service 22 @ 13:07      Interval Events:   Patient seen and examined.   Daughter at bedside.   s/p endoscopy today    Hospital Medications:  acetaminophen     Tablet .. 650 milliGRAM(s) Oral every 6 hours PRN  aluminum hydroxide/magnesium hydroxide/simethicone Suspension 30 milliLiter(s) Oral every 4 hours PRN  amLODIPine   Tablet 5 milliGRAM(s) Oral daily  aspirin  chewable 81 milliGRAM(s) Oral daily  heparin   Injectable 5000 Unit(s) SubCutaneous every 8 hours  influenza  Vaccine (HIGH DOSE) 0.7 milliLiter(s) IntraMuscular once  lidocaine 4% Injection for Nebulization 4 milliLiter(s) Nebulizer once  melatonin 3 milliGRAM(s) Oral at bedtime PRN  ondansetron Injectable 4 milliGRAM(s) IV Push every 8 hours PRN  pantoprazole  Injectable 40 milliGRAM(s) IV Push two times a day  sodium chloride 0.9%. 500 milliLiter(s) IV Continuous <Continuous>        Review of Systems:  General:  No wt loss, fevers, chills, night sweats, fatigue,   Eyes:  Good vision, no reported pain  ENT:  No sore throat, pain, runny nose, dysphagia  CV:  No pain, palpitations, hypo/hypertension  Resp:  No dyspnea, cough, tachypnea, wheezing  GI:  See HPI  :  No pain, bleeding, incontinence, nocturia  Muscle:  No pain, weakness  Neuro:  No weakness, tingling, memory problems  Psych:  No fatigue, insomnia, mood problems, depression  Endocrine:  No polyuria, polydipsia, cold/heat intolerance  Heme:  No petechiae, ecchymosis, easy bruisability  Integumentary:  No rash, edema    PHYSICAL EXAM:   Vital Signs:  Vital Signs Last 24 Hrs  T(C): 36.7 (2022 11:24), Max: 36.7 (2022 21:43)  T(F): 98.1 (2022 11:24), Max: 98.1 (2022 05:25)  HR: 62 (2022 11:24) (55 - 85)  BP: 131/77 (2022 11:24) (120/72 - 146/79)  BP(mean): --  RR: 18 (2022 11:24) (15 - 18)  SpO2: 95% (2022 11:24) (94% - 99%)  Daily Height in cm: 180.34 (2022 09:08)    Daily       PHYSICAL EXAM:     GENERAL:  Appears stated age, well-groomed, well-nourished, no distress  HEENT:  NC/AT,  conjunctivae anicteric, clear and pink,   NECK: supple, trachea midline  CHEST:  Full & symmetric excursion, no increased effort, breath sounds clear  HEART:  Regular rhythm, no JVD  ABDOMEN:  Soft, non-tender, non-distended, normoactive bowel sounds,  no masses , no hepatosplenomegaly  EXTREMITIES:  no cyanosis,clubbing or edema  SKIN:  No rash, erythema, or, ecchymoses, no jaundice  NEURO:  Alert, non-focal, no asterixis  PSYCH: Appropriate affect, oriented to place and time  RECTAL: Deferred      LABS Personally reviewed by me:                        11.1   7.33  )-----------( 231      ( 2022 06:48 )             34.6     Mean Cell Volume: 67.8 fl (22 @ 06:48)    06-    140  |  104  |  10  ----------------------------<  97  4.0   |  26  |  0.76    Ca    9.2      2022 02:34    TPro  6.8  /  Alb  4.0  /  TBili  0.7  /  DBili  x   /  AST  13  /  ALT  9<L>  /  AlkPhos  67  06-01    LIVER FUNCTIONS - ( 2022 02:34 )  Alb: 4.0 g/dL / Pro: 6.8 g/dL / ALK PHOS: 67 U/L / ALT: 9 U/L / AST: 13 U/L / GGT: x           PT/INR - ( 2022 02:34 )   PT: 12.1 sec;   INR: 1.04 ratio         PTT - ( 2022 09:00 )  PTT:44.1 sec  Urinalysis Basic - ( 2022 03:04 )    Color: Colorless / Appearance: Clear / S.005 / pH: x  Gluc: x / Ketone: Negative  / Bili: Negative / Urobili: Negative   Blood: x / Protein: Negative / Nitrite: Negative   Leuk Esterase: Negative / RBC: x / WBC x   Sq Epi: x / Non Sq Epi: x / Bacteria: x                              11.1   7.33  )-----------( 231      ( 2022 06:48 )             34.6                         11.5   8.28  )-----------( 244      ( 2022 09:00 )             35.5                         11.6   10.21 )-----------( 249      ( 2022 02:34 )             35.3       Imaging personally reviewed by me:

## 2022-06-02 NOTE — CONSULT NOTE ADULT - SUBJECTIVE AND OBJECTIVE BOX
Surgical Oncology Consult  Consulting surgical team: Surgical Oncology ([facility])  Consulting attending: Ever Thornton    HPI:  67M active smoker w/ PMH HLD, Mediterranean anemia p/w 2-wk h/o epigastric abdominal pain. Describes pain as sharp that started 2-weeks ago while doing some manual work at home. It is intermittent and usually last 30-40mins with peak intensity of 7/8 and has used advil with some relief. Of note, he presented to the ED a few days ago with same complaint with an unremarkable w/u including RUQ US and EKG that showed NSR with poor R-wave progression. Says that since discharge, the pain has progressively worsened and was so severe last night, he decided to come to the hospital for re-evaluation. He felt a bit nauseous but denied vomiting. He denied association with food,melena/hematochezia. Also denied Denied fever/chill, CP, SOB, dizziness.    ED course: Afebrile, BP elevated to 188/77. RR and O2 sats OK. EKG with CASS in Leads v1-v3,. Loaded with ASA 325mg and Started on Heparin gtt. Trops neg x1. Seen by cardiology (2022 06:34)    Patient denies family history of cancer. He does consume a high amount of red meats and is an active smoker. He states he had an upper endoscopy last year and was treated for a bacterial infection, but cannot recall further details (organism, antibiotics used).       PAST MEDICAL HISTORY:  Hypercholesterolemia    Mediterranean anemia        PAST SURGICAL HISTORY:  S/P hernia surgery        MEDICATIONS:  acetaminophen     Tablet .. 650 milliGRAM(s) Oral every 6 hours PRN  aluminum hydroxide/magnesium hydroxide/simethicone Suspension 30 milliLiter(s) Oral every 4 hours PRN  amLODIPine   Tablet 5 milliGRAM(s) Oral daily  aspirin  chewable 81 milliGRAM(s) Oral daily  heparin   Injectable 5000 Unit(s) SubCutaneous every 8 hours  influenza  Vaccine (HIGH DOSE) 0.7 milliLiter(s) IntraMuscular once  lidocaine 4% Injection for Nebulization 4 milliLiter(s) Nebulizer once  melatonin 3 milliGRAM(s) Oral at bedtime PRN  ondansetron Injectable 4 milliGRAM(s) IV Push every 8 hours PRN  pantoprazole  Injectable 40 milliGRAM(s) IV Push two times a day  sodium chloride 0.9%. 500 milliLiter(s) IV Continuous <Continuous>      ALLERGIES:  No Known Allergies      VITALS & I/Os:  Vital Signs Last 24 Hrs  T(C): 36.7 (2022 20:27), Max: 36.7 (2022 21:43)  T(F): 98 (2022 20:27), Max: 98.1 (2022 05:25)  HR: 65 (2022 20:27) (62 - 85)  BP: 151/82 (2022 20:27) (120/72 - 151/82)  BP(mean): --  RR: 18 (2022 20:27) (15 - 18)  SpO2: 94% (2022 20:27) (94% - 99%)    I&O's Summary    2022 07:01  -  2022 07:00  --------------------------------------------------------  IN: 240 mL / OUT: 0 mL / NET: 240 mL    2022 07:01  -  2022 20:57  --------------------------------------------------------  IN: 220 mL / OUT: 0 mL / NET: 220 mL        PHYSICAL EXAM:  General: No acute distress  Respiratory: Nonlabored  Cardiovascular: RRR  Abdominal: Soft, nondistended, nontender. No rebound or guarding. No organomegaly, no palpable mass.  Extremities: Warm    LABS:                        11.1   7.33  )-----------( 231      ( 2022 06:48 )             34.6     06-01    140  |  104  |  10  ----------------------------<  97  4.0   |  26  |  0.76    Ca    9.2      2022 02:34    TPro  6.8  /  Alb  4.0  /  TBili  0.7  /  DBili  x   /  AST  13  /  ALT  9<L>  /  AlkPhos  67  06-01    Lactate:    PT/INR - ( 2022 02:34 )   PT: 12.1 sec;   INR: 1.04 ratio         PTT - ( 2022 09:00 )  PTT:44.1 sec    CARDIAC MARKERS ( 2022 04:01 )  x     / x     / x     / x     / 1.9 ng/mL  CARDIAC MARKERS ( 2022 02:34 )  x     / x     / 38 U/L / x     / 2.1 ng/mL        Urinalysis Basic - ( 2022 03:04 )    Color: Colorless / Appearance: Clear / S.005 / pH: x  Gluc: x / Ketone: Negative  / Bili: Negative / Urobili: Negative   Blood: x / Protein: Negative / Nitrite: Negative   Leuk Esterase: Negative / RBC: x / WBC x   Sq Epi: x / Non Sq Epi: x / Bacteria: x        IMAGING:  < from: CT Abdomen and Pelvis w/ IV Cont (22 @ 11:40) >  ACC: [id number] EXAM:  CT ABDOMEN AND PELVIS IC                          PROCEDURE DATE:  2022          INTERPRETATION:  CLINICAL INFORMATION: Diffuse abdominal pain.    COMPARISON: Ultrasound abdomen 2022.    CONTRAST/COMPLICATIONS:  IV Contrast: Omnipaque 350. 90 cc administered. 0 cc discarded.  Oral Contrast: None.  Complications: None documented.     PROCEDURE:  CT of the Abdomen and Pelvis was performed.  Sagittal and coronal reformats were performed.    FINDINGS:  LOWER CHEST: Within normal limits.    LIVER: Small left hepatic lobe cyst. Additional subcentimeter   hypoattenuating focus too small to characterize.  BILE DUCTS: Normal caliber.  GALLBLADDER: Within normal limits.  SPLEEN: Within normal limits.  PANCREAS: Within normal limits.  ADRENALS: Within normal limits.  KIDNEYS/URETERS: Symmetric renal enhancement. No hydronephrosis.   Bilateral subcentimeter hypoattenuating foci which are too small to   characterize.    BLADDER: Within normal limits.  REPRODUCTIVE ORGANS: Prostate is mildly enlarged.    BOWEL: No bowel obstruction. Appendix is normal. Mild colonic   diverticulosis without evidence of diverticulitis. Focal soft tissue   thickening along the lesser curvature of the stomach approximately   spanning 6.5 cm (2, 25) with large ulceration defect. Minimal adjacent   infiltration of the perigastric fat (602, 38) and subcentimeter lymph   nodes.  PERITONEUM: No ascites or free air.  VESSELS: Atherosclerotic changes.  RETROPERITONEUM/LYMPH NODES: No lymphadenopathy.  ABDOMINAL WALL: Within normal limits.  BONES: Degenerative changes.    IMPRESSION:    Focal soft tissue thickening along the lesser curvature of the stomach   with central ulceration, concerning for gastric neoplasm. Correlate with   endoscopy.        --- End of Report ---          URBAN MCCORMICK MD; Resident Radiology  This document has been electronically signed.  RAKESH SHEPARD MD; Attending Radiologist  This document has been electronically signed. 2022  1:45PM    < end of copied text >    < from: Upper Endoscopy (22 @ 08:36) >  Findings:       The examined esophagus was normal.       One massive (10cm) cratered gastric ulcer with heaped edges was found at the incisura.        Biopsies were taken with a cold forceps for histology.       The exam of the stomach was otherwise normal. Biopsies were taken from the normal mucosa for        H pylori.       The examined duodenum was normal.                                   Impression:          - Malignant appearing gastric ulcer. Differential diagnosis includes                        adenocarcinoma, lymphoma.  Recommendation:      - Await pathology results.                       - PPI BID                       - Oncology evaluation                       - [place] [facility]    < end of copied text >

## 2022-06-02 NOTE — PRE PROCEDURE NOTE - PRE PROCEDURE EVALUATION
Attending Physician:   Dr. Rodrigues                         Procedure:   EGD    Indication for Procedure:   Abdominal Pain  ________________________________________________________  PAST MEDICAL & SURGICAL HISTORY:    Hypercholesterolemia  Mediterranean anemia    S/P hernia surgery    ALLERGIES:  No Known Allergies    HOME MEDICATIONS:    AICD/PPM: [ ] yes   [ X] no    PERTINENT LAB DATA:                        11.1   7.33  )-----------( 231      ( 02 Jun 2022 06:48 )             34.6     06-01    140  |  104  |  10  ----------------------------<  97  4.0   |  26  |  0.76    Ca    9.2      01 Jun 2022 02:34  TPro  6.8  /  Alb  4.0  /  TBili  0.7  /  DBili  x   /  AST  13  /  ALT  9<L>  /  AlkPhos  67  06-01  PT/INR - ( 01 Jun 2022 02:34 )   PT: 12.1 sec;   INR: 1.04 ratio    PTT - ( 01 Jun 2022 09:00 )  PTT:44.1 sec  CARDIAC MARKERS ( 01 Jun 2022 04:01 )  x     / x     / x     / x     / 1.9 ng/mL  CARDIAC MARKERS ( 01 Jun 2022 02:34 )  x     / x     / 38 U/L / x     / 2.1 ng/mL    PHYSICAL EXAMINATION:      Weight (kg): 73.4T(C): 36.3  HR: 68  BP: 144/89  RR: 17  SpO2: 94%    Constitutional: NAD    Neck:  No JVD  Respiratory: CTAB/L  Cardiovascular: S1 and S2  Gastrointestinal: BS+, soft, NT/ND  Extremities: No peripheral edema  Neurological: A/O x 4    COMMENTS:    The patient is a suitable candidate for the planned procedure unless box checked [ ]  No, explain:

## 2022-06-02 NOTE — PROGRESS NOTE ADULT - SUBJECTIVE AND OBJECTIVE BOX
DATE OF SERVICE: 06-02-22     Patient is a 67y old  Male who presents with a chief complaint of abdominal pain (02 Jun 2022 20:57)      INTERVAL HISTORY: feels ok    	  MEDICATIONS:  amLODIPine   Tablet 5 milliGRAM(s) Oral daily        PHYSICAL EXAM:  T(C): 36.7 (06-02-22 @ 20:27), Max: 36.7 (06-02-22 @ 05:25)  HR: 65 (06-02-22 @ 20:27) (62 - 85)  BP: 151/82 (06-02-22 @ 20:27) (129/65 - 151/82)  RR: 18 (06-02-22 @ 20:27) (15 - 18)  SpO2: 94% (06-02-22 @ 20:27) (94% - 99%)  Wt(kg): --  I&O's Summary    01 Jun 2022 07:01  -  02 Jun 2022 07:00  --------------------------------------------------------  IN: 240 mL / OUT: 0 mL / NET: 240 mL    02 Jun 2022 07:01  -  02 Jun 2022 23:32  --------------------------------------------------------  IN: 220 mL / OUT: 0 mL / NET: 220 mL      Height (cm): 180.3 (06-02 @ 09:08)  Weight (kg): 73.4 (06-02 @ 09:08)  BMI (kg/m2): 22.6 (06-02 @ 09:08)  BSA (m2): 1.93 (06-02 @ 09:08)    Appearance: In no distress	  HEENT:    PERRL, EOMI	  Cardiovascular:  S1 S2, No JVD  Respiratory: Lungs clear to auscultation	  Gastrointestinal:  Soft, Non-tender, + BS	  Vascularature:  No edema of LE  Psychiatric: Appropriate affect   Neuro: no acute focal deficits                               11.1   7.33  )-----------( 231      ( 02 Jun 2022 06:48 )             34.6     06-01    140  |  104  |  10  ----------------------------<  97  4.0   |  26  |  0.76    Ca    9.2      01 Jun 2022 02:34    TPro  6.8  /  Alb  4.0  /  TBili  0.7  /  DBili  x   /  AST  13  /  ALT  9<L>  /  AlkPhos  67  06-01        Labs personally reviewed      Assessment /Plan:     Mr. Jordan is a 68 yo old male with PMH of active smoker, HTN, HLD and mediterranean anemia who presents with worsening abdominal pain. Cardiology consulted for concern of abnormal EKG. Patient denies chest pain, palpitations, edema, orthopnea, SOB or syncope. Does not have routine medical follow up. Has not had recent cardiology workup.     Problem/Plan -1  Problem: Epigastric pain   - EKG with discreet wade in V1-V3 but not diagnostic for injury pattern  - Troponin negative x2  - Patient denies chest pain or shortness of breath.   - POCUS reveals no pericardial effusion and no global WMA  - CXR with small left pleural efusion with left basilar atelectasis.  - c/w ASA 81 mg PO daily   - TTE unremarkable  - no further inpt cardiac work up planned  - EGD with ?CA, likely cause of pt symptoms, work up ongoing    Problem/Plan -2  Problem: HTN  - c.w amlodipine 5mg PO daily    Problem/Plan -3  Problem: HLD  - Check lipid panel     Problem/Plan -4  Problem: DVT PPX  - c/w Hep SQ    Problem/Plan -5  Problem: Smoking Cessation  - Discussed with patient at bedside.      I had a prolonged conversation with the patient regarding hospital course, differential diagnosis and results of diagnostic tests.  Plan of care discussed with patient after the evaluation. Patient expresses clear understanding and satisfaction with the plan of care. Sixty five minutes spent on encounter, of which more than fifty percent of the encounter was spent on counseling and/or coordinating care by the attending physician.      Vu Troy DO [facility]  Cardiovascular Medicine  [address] [place] [address], [address]  Office: [phone]  Cell: [phone]

## 2022-06-03 ENCOUNTER — TRANSCRIPTION ENCOUNTER (OUTPATIENT)
Age: 67
End: 2022-06-03

## 2022-06-03 VITALS
SYSTOLIC BLOOD PRESSURE: 159 MMHG | TEMPERATURE: 98 F | OXYGEN SATURATION: 93 % | RESPIRATION RATE: 19 BRPM | DIASTOLIC BLOOD PRESSURE: 82 MMHG | HEART RATE: 64 BPM

## 2022-06-03 LAB
ANION GAP SERPL CALC-SCNC: 9 MMOL/L — SIGNIFICANT CHANGE UP (ref 5–17)
BUN SERPL-MCNC: 15 MG/DL — SIGNIFICANT CHANGE UP (ref 7–23)
CALCIUM SERPL-MCNC: 9.3 MG/DL — SIGNIFICANT CHANGE UP (ref 8.4–10.5)
CANCER AG19-9 SERPL-ACNC: <2 U/ML — SIGNIFICANT CHANGE UP
CEA SERPL-MCNC: 3.8 NG/ML — SIGNIFICANT CHANGE UP (ref 0–3.8)
CHLORIDE SERPL-SCNC: 103 MMOL/L — SIGNIFICANT CHANGE UP (ref 96–108)
CO2 SERPL-SCNC: 27 MMOL/L — SIGNIFICANT CHANGE UP (ref 22–31)
CREAT SERPL-MCNC: 0.82 MG/DL — SIGNIFICANT CHANGE UP (ref 0.5–1.3)
EGFR: 97 ML/MIN/1.73M2 — SIGNIFICANT CHANGE UP
GLUCOSE SERPL-MCNC: 109 MG/DL — HIGH (ref 70–99)
HCT VFR BLD CALC: 36.1 % — LOW (ref 39–50)
HGB BLD-MCNC: 11.8 G/DL — LOW (ref 13–17)
LDH SERPL L TO P-CCNC: 126 U/L — SIGNIFICANT CHANGE UP (ref 50–242)
MCHC RBC-ENTMCNC: 22 PG — LOW (ref 27–34)
MCHC RBC-ENTMCNC: 32.7 GM/DL — SIGNIFICANT CHANGE UP (ref 32–36)
MCV RBC AUTO: 67.2 FL — LOW (ref 80–100)
NRBC # BLD: 0 /100 WBCS — SIGNIFICANT CHANGE UP (ref 0–0)
PLATELET # BLD AUTO: 233 K/UL — SIGNIFICANT CHANGE UP (ref 150–400)
POTASSIUM SERPL-MCNC: 3.9 MMOL/L — SIGNIFICANT CHANGE UP (ref 3.5–5.3)
POTASSIUM SERPL-SCNC: 3.9 MMOL/L — SIGNIFICANT CHANGE UP (ref 3.5–5.3)
RBC # BLD: 5.37 M/UL — SIGNIFICANT CHANGE UP (ref 4.2–5.8)
RBC # FLD: 15 % — HIGH (ref 10.3–14.5)
SODIUM SERPL-SCNC: 139 MMOL/L — SIGNIFICANT CHANGE UP (ref 135–145)
WBC # BLD: 8.55 K/UL — SIGNIFICANT CHANGE UP (ref 3.8–10.5)
WBC # FLD AUTO: 8.55 K/UL — SIGNIFICANT CHANGE UP (ref 3.8–10.5)

## 2022-06-03 PROCEDURE — 81342 TRG GENE REARRANGEMENT ANAL: CPT

## 2022-06-03 PROCEDURE — 83036 HEMOGLOBIN GLYCOSYLATED A1C: CPT

## 2022-06-03 PROCEDURE — 83615 LACTATE (LD) (LDH) ENZYME: CPT

## 2022-06-03 PROCEDURE — 96374 THER/PROPH/DIAG INJ IV PUSH: CPT

## 2022-06-03 PROCEDURE — 85018 HEMOGLOBIN: CPT

## 2022-06-03 PROCEDURE — 71045 X-RAY EXAM CHEST 1 VIEW: CPT

## 2022-06-03 PROCEDURE — 36415 COLL VENOUS BLD VENIPUNCTURE: CPT

## 2022-06-03 PROCEDURE — 88305 TISSUE EXAM BY PATHOLOGIST: CPT

## 2022-06-03 PROCEDURE — 82947 ASSAY GLUCOSE BLOOD QUANT: CPT

## 2022-06-03 PROCEDURE — 82553 CREATINE MB FRACTION: CPT

## 2022-06-03 PROCEDURE — 81003 URINALYSIS AUTO W/O SCOPE: CPT

## 2022-06-03 PROCEDURE — 82435 ASSAY OF BLOOD CHLORIDE: CPT

## 2022-06-03 PROCEDURE — 88360 TUMOR IMMUNOHISTOCHEM/MANUAL: CPT

## 2022-06-03 PROCEDURE — 84295 ASSAY OF SERUM SODIUM: CPT

## 2022-06-03 PROCEDURE — 99232 SBSQ HOSP IP/OBS MODERATE 35: CPT

## 2022-06-03 PROCEDURE — 74177 CT ABD & PELVIS W/CONTRAST: CPT

## 2022-06-03 PROCEDURE — 86900 BLOOD TYPING SEROLOGIC ABO: CPT

## 2022-06-03 PROCEDURE — 81340 TRB@ GENE REARRANGE AMPLIFY: CPT

## 2022-06-03 PROCEDURE — 93306 TTE W/DOPPLER COMPLETE: CPT

## 2022-06-03 PROCEDURE — 81261 IGH GENE REARRANGE AMP METH: CPT

## 2022-06-03 PROCEDURE — 99285 EMERGENCY DEPT VISIT HI MDM: CPT | Mod: 25

## 2022-06-03 PROCEDURE — 71250 CT THORAX DX C-: CPT

## 2022-06-03 PROCEDURE — 80053 COMPREHEN METABOLIC PANEL: CPT

## 2022-06-03 PROCEDURE — 86901 BLOOD TYPING SEROLOGIC RH(D): CPT

## 2022-06-03 PROCEDURE — 85610 PROTHROMBIN TIME: CPT

## 2022-06-03 PROCEDURE — 82330 ASSAY OF CALCIUM: CPT

## 2022-06-03 PROCEDURE — 96375 TX/PRO/DX INJ NEW DRUG ADDON: CPT

## 2022-06-03 PROCEDURE — 88365 INSITU HYBRIDIZATION (FISH): CPT

## 2022-06-03 PROCEDURE — 84484 ASSAY OF TROPONIN QUANT: CPT

## 2022-06-03 PROCEDURE — 82803 BLOOD GASES ANY COMBINATION: CPT

## 2022-06-03 PROCEDURE — 86301 IMMUNOASSAY TUMOR CA 19-9: CPT

## 2022-06-03 PROCEDURE — 80061 LIPID PANEL: CPT

## 2022-06-03 PROCEDURE — 83605 ASSAY OF LACTIC ACID: CPT

## 2022-06-03 PROCEDURE — 82378 CARCINOEMBRYONIC ANTIGEN: CPT

## 2022-06-03 PROCEDURE — 82550 ASSAY OF CK (CPK): CPT

## 2022-06-03 PROCEDURE — 84132 ASSAY OF SERUM POTASSIUM: CPT

## 2022-06-03 PROCEDURE — 85027 COMPLETE CBC AUTOMATED: CPT

## 2022-06-03 PROCEDURE — 85025 COMPLETE CBC W/AUTO DIFF WBC: CPT

## 2022-06-03 PROCEDURE — U0003: CPT

## 2022-06-03 PROCEDURE — 85730 THROMBOPLASTIN TIME PARTIAL: CPT

## 2022-06-03 PROCEDURE — 81264 IGK REARRANGEABN CLONAL POP: CPT

## 2022-06-03 PROCEDURE — 93308 TTE F-UP OR LMTD: CPT

## 2022-06-03 PROCEDURE — 87086 URINE CULTURE/COLONY COUNT: CPT

## 2022-06-03 PROCEDURE — 86803 HEPATITIS C AB TEST: CPT

## 2022-06-03 PROCEDURE — 71250 CT THORAX DX C-: CPT | Mod: 26

## 2022-06-03 PROCEDURE — 80048 BASIC METABOLIC PNL TOTAL CA: CPT

## 2022-06-03 PROCEDURE — 86850 RBC ANTIBODY SCREEN: CPT

## 2022-06-03 PROCEDURE — 83690 ASSAY OF LIPASE: CPT

## 2022-06-03 PROCEDURE — 85014 HEMATOCRIT: CPT

## 2022-06-03 PROCEDURE — 82565 ASSAY OF CREATININE: CPT

## 2022-06-03 PROCEDURE — 88341 IMHCHEM/IMCYTCHM EA ADD ANTB: CPT

## 2022-06-03 RX ORDER — ASPIRIN/CALCIUM CARB/MAGNESIUM 324 MG
1 TABLET ORAL
Qty: 0 | Refills: 0 | DISCHARGE
Start: 2022-06-03

## 2022-06-03 RX ORDER — AMLODIPINE BESYLATE 2.5 MG/1
1 TABLET ORAL
Qty: 30 | Refills: 0
Start: 2022-06-03 | End: 2022-07-02

## 2022-06-03 RX ORDER — PANTOPRAZOLE SODIUM 20 MG/1
1 TABLET, DELAYED RELEASE ORAL
Qty: 60 | Refills: 0
Start: 2022-06-03 | End: 2022-07-02

## 2022-06-03 RX ADMIN — PANTOPRAZOLE SODIUM 40 MILLIGRAM(S): 20 TABLET, DELAYED RELEASE ORAL at 06:18

## 2022-06-03 RX ADMIN — AMLODIPINE BESYLATE 5 MILLIGRAM(S): 2.5 TABLET ORAL at 06:18

## 2022-06-03 RX ADMIN — PANTOPRAZOLE SODIUM 40 MILLIGRAM(S): 20 TABLET, DELAYED RELEASE ORAL at 17:01

## 2022-06-03 RX ADMIN — Medication 81 MILLIGRAM(S): at 12:00

## 2022-06-03 RX ADMIN — HEPARIN SODIUM 5000 UNIT(S): 5000 INJECTION INTRAVENOUS; SUBCUTANEOUS at 08:04

## 2022-06-03 RX ADMIN — HEPARIN SODIUM 5000 UNIT(S): 5000 INJECTION INTRAVENOUS; SUBCUTANEOUS at 17:02

## 2022-06-03 NOTE — PROGRESS NOTE ADULT - ASSESSMENT
The patient is a 67 year old male active smoker with history of HLD, Mediterranean anemia presenting with 2 weeks of epigastric abdominal pain.    1. Epigastric pain  -CT abdomen reviewed; concerning for gastric ulcer vs neoplasm  -s/p endoscopy 6/2 malignant appearing gastric ulcer. DDx includes adenocarcinoma, lymphoma  -biopsies taken, pending path  -PPI BID  - appreciate surg onc eval  - pan CT done, will follow up      2. abnormal EKG  -per cards  -pending CTA coronaries     3. HTN    The plan of care was discussed with the physician assistant and modifications were made to the notation where appropriate.   Differential diagnosis and plan of care discussed with patient after the evaluation  35 minutes spent on total encounter of which more than fifty percent of the encounter was spent counseling and/or coordinating care by the attending physician.    [facility]  Gastroenterology and Hepatology  [address] [place], [place]  Office: [phone]  Cell: [phone]

## 2022-06-03 NOTE — PROGRESS NOTE ADULT - ATTENDING COMMENTS
CT chest does not show evidence to suggest pulmonary metastasis.  CEA/CA 19-9 within normal limits.  Await EGD/Bx pathology.  Will reach out to patient with biopsy results and appropriate office follow up.

## 2022-06-03 NOTE — PROGRESS NOTE ADULT - SUBJECTIVE AND OBJECTIVE BOX
Name of Patient : CHANDRIKA LIU  MRN: [id number]  Date of visit: 06-03-22 @ 10:38      Subjective: Patient seen and examined. No new events except as noted.   Patient is going for CT Chest today.     REVIEW OF SYSTEMS:    CONSTITUTIONAL: No weakness, fevers or chills  EYES/ENT: No visual changes;  No vertigo or throat pain   NECK: No pain or stiffness  RESPIRATORY: No cough, wheezing, hemoptysis; No shortness of breath  CARDIOVASCULAR: No chest pain or palpitations  GASTROINTESTINAL: No abdominal or epigastric pain. No nausea, vomiting, or hematemesis; No diarrhea or constipation. No melena or hematochezia.  GENITOURINARY: No dysuria, frequency or hematuria  NEUROLOGICAL: No numbness or weakness  SKIN: No itching, burning, rashes, or lesions   All other review of systems is negative unless indicated above.    MEDICATIONS:  MEDICATIONS  (STANDING):  amLODIPine   Tablet 5 milliGRAM(s) Oral daily  aspirin  chewable 81 milliGRAM(s) Oral daily  heparin   Injectable 5000 Unit(s) SubCutaneous every 8 hours  influenza  Vaccine (HIGH DOSE) 0.7 milliLiter(s) IntraMuscular once  lidocaine 4% Injection for Nebulization 4 milliLiter(s) Nebulizer once  pantoprazole  Injectable 40 milliGRAM(s) IV Push two times a day  sodium chloride 0.9%. 500 milliLiter(s) (30 mL/Hr) IV Continuous <Continuous>      PHYSICAL EXAM:  T(C): 36.6 (06-03-22 @ 09:43), Max: 36.7 (06-02-22 @ 11:24)  HR: 75 (06-03-22 @ 09:43) (62 - 75)  BP: 166/73 (06-03-22 @ 09:43) (128/66 - 166/73)  RR: 19 (06-03-22 @ 09:43) (17 - 19)  SpO2: 95% (06-03-22 @ 09:43) (93% - 95%)  Wt(kg): --  I&O's Summary    02 Jun 2022 07:01  -  03 Jun 2022 07:00  --------------------------------------------------------  IN: 220 mL / OUT: 0 mL / NET: 220 mL    03 Jun 2022 07:01  -  03 Jun 2022 10:38  --------------------------------------------------------  IN: 280 mL / OUT: 0 mL / NET: 280 mL          Appearance: Normal	  HEENT:  PERRLA   Lymphatic: No lymphadenopathy   Cardiovascular: Normal S1 S2, no JVD  Respiratory: normal effort , clear  Gastrointestinal:  Soft, Non-tender  Skin: No rashes,  warm to touch  Psychiatry:  Mood & affect appropriate  Musculuskeletal: No edema      06-02-22 @ 07:01  -  06-03-22 @ 07:00  --------------------------------------------------------  IN: 220 mL / OUT: 0 mL / NET: 220 mL    06-03-22 @ 07:01  -  06-03-22 @ 10:38  --------------------------------------------------------  IN: 280 mL / OUT: 0 mL / NET: 280 mL                                  11.8   8.55  )-----------( 233      ( 03 Jun 2022 05:23 )             36.1               06-03    139  |  103  |  15  ----------------------------<  109<H>  3.9   |  27  |  0.82    Ca    9.3      03 Jun 2022 05:23               Carcinoembryonic Antigen (06.03.22 @ 05:23)   Carcinoembryonic Antigen: 3.8    Cancer Antigen, GI Ca 19-9 (06.03.22 @ 05:23)   Cancer Antigen, GI Ca 19-9: <2    Lactate Dehydrogenase, Serum in AM (06.03.22 @ 05:23)   Lactate Dehydrogenase, Serum: 126 U/L  Name of Patient : CHANDRIAK LIU  MRN: [id number]  Date of visit: 06-03-22 @ 10:38      Subjective: Patient seen and examined. No new events except as noted.   Patient is going for CT Chest today.   Wife at the bedside.     REVIEW OF SYSTEMS:    CONSTITUTIONAL: No weakness, fevers or chills  EYES/ENT: No visual changes;  No vertigo or throat pain   NECK: No pain or stiffness  RESPIRATORY: No cough, wheezing, hemoptysis; No shortness of breath  CARDIOVASCULAR: No chest pain or palpitations  GASTROINTESTINAL: No abdominal or epigastric pain. No nausea, vomiting, or hematemesis; No diarrhea or constipation. No melena or hematochezia.  GENITOURINARY: No dysuria, frequency or hematuria  NEUROLOGICAL: No numbness or weakness  SKIN: No itching, burning, rashes, or lesions   All other review of systems is negative unless indicated above.    MEDICATIONS:  MEDICATIONS  (STANDING):  amLODIPine   Tablet 5 milliGRAM(s) Oral daily  aspirin  chewable 81 milliGRAM(s) Oral daily  heparin   Injectable 5000 Unit(s) SubCutaneous every 8 hours  influenza  Vaccine (HIGH DOSE) 0.7 milliLiter(s) IntraMuscular once  lidocaine 4% Injection for Nebulization 4 milliLiter(s) Nebulizer once  pantoprazole  Injectable 40 milliGRAM(s) IV Push two times a day  sodium chloride 0.9%. 500 milliLiter(s) (30 mL/Hr) IV Continuous <Continuous>      PHYSICAL EXAM:  T(C): 36.6 (06-03-22 @ 09:43), Max: 36.7 (06-02-22 @ 11:24)  HR: 75 (06-03-22 @ 09:43) (62 - 75)  BP: 166/73 (06-03-22 @ 09:43) (128/66 - 166/73)  RR: 19 (06-03-22 @ 09:43) (17 - 19)  SpO2: 95% (06-03-22 @ 09:43) (93% - 95%)  Wt(kg): --  I&O's Summary    02 Jun 2022 07:01  -  03 Jun 2022 07:00  --------------------------------------------------------  IN: 220 mL / OUT: 0 mL / NET: 220 mL    03 Jun 2022 07:01  -  03 Jun 2022 10:38  --------------------------------------------------------  IN: 280 mL / OUT: 0 mL / NET: 280 mL          Appearance: Normal	  HEENT:  PERRLA   Lymphatic: No lymphadenopathy   Cardiovascular: Normal S1 S2, no JVD  Respiratory: normal effort , clear  Gastrointestinal:  Soft, Non-tender  Skin: No rashes,  warm to touch  Psychiatry:  Mood & affect appropriate  Musculuskeletal: No edema      06-02-22 @ 07:01  -  06-03-22 @ 07:00  --------------------------------------------------------  IN: 220 mL / OUT: 0 mL / NET: 220 mL    06-03-22 @ 07:01  -  06-03-22 @ 10:38  --------------------------------------------------------  IN: 280 mL / OUT: 0 mL / NET: 280 mL                                  11.8   8.55  )-----------( 233      ( 03 Jun 2022 05:23 )             36.1               06-03    139  |  103  |  15  ----------------------------<  109<H>  3.9   |  27  |  0.82    Ca    9.3      03 Jun 2022 05:23               Carcinoembryonic Antigen (06.03.22 @ 05:23)   Carcinoembryonic Antigen: 3.8    Cancer Antigen, GI Ca 19-9 (06.03.22 @ 05:23)   Cancer Antigen, GI Ca 19-9: <2    Lactate Dehydrogenase, Serum in AM (06.03.22 @ 05:23)   Lactate Dehydrogenase, Serum: 126 U/L     < from: CT Chest No Cont (06.03.22 @ 09:53) >  ACC: [id number] EXAM:  CT CHEST                          PROCEDURE DATE:  06/03/2022          INTERPRETATION:  CLINICAL INFORMATION: Abdominal pain with possible   gastric malignancy. Rule out malignancy within the chest.    COMPARISON: CT abdomen pelvis 6/1/2022    CONTRAST/COMPLICATIONS:  IV Contrast: None.  Oral Contrast: None.  Complications: None documented.    PROCEDURE:  CT scan of the chest was obtained without intravenous contrast.    FINDINGS:    LYMPH NODES: Unremarkable.    HEART/VASCULATURE: Heart size normal. No pericardial effusion.   Atherosclerotic changes of the coronary arteries and aorta.    AIRWAYS/LUNGS/PLEURA: Central airways are patent. Azygos lobe is present.   Mild biapical lung scarring. Mild emphysema. Centrilobular 2 mm nodules   and tree-in-bud opacities within the bilateral upper lobes. Linear   scarring/atelectasis in left lower lobe with associated bronchiectasis.   No pleural effusion. No pneumothorax.    UPPER ABDOMEN: Redemonstration of focal soft tissue thickening along the   lesser curvature of the stomach. Mild nodular contour of the liver. Left   hepatic lobe cyst.    BONES/SOFT TISSUES: Degenerative changes. Chronic bilateral rib fractures.    IMPRESSION:    No evidence of malignancy within thechest.    Emphysema with bilateral upper lobe centrilobular nodules and tree-in-bud   opacities. These nodules and opacities may represent impacted distal   airways or respiratory bronchiolitis.    Redemonstration of possible gastric malignancy, which is better evaluated   on prior contrast-enhanced CT abdomen pelvis.    --- End of Report ---        < end of copied text >

## 2022-06-03 NOTE — DISCHARGE NOTE NURSING/CASE MANAGEMENT/SOCIAL WORK - NSDCPEFALRISK_GEN_ALL_CORE
For information on Fall & Injury Prevention, visit: [email or web address] OR  [email or web address] OR  https://www.cdc.gov/steadi/patient.html

## 2022-06-03 NOTE — PROGRESS NOTE ADULT - SUBJECTIVE AND OBJECTIVE BOX
GENERAL SURGERY PROGRESS NOTE   ___________________________________________________________________    CHANDRIKA LIU | [id number] | 67y Male | [facility] 6TOW 622 W1 | LOS 2d    Attending: Aquilino Toney    ___________________________________________________________________    CC: Patient is a 67y old  Male who presents with a chief complaint of abdominal pain (02 Jun 2022 20:57)      SUBJECTIVE:   Patient seen today during morning rounds at bedside and found to be without acute distress and resting comfortably. Denies chest pain, fever, severe pain, or SOB.     Overnight: Unremarkable    Allergies  NKDA    OBJECTIVE:  Vitals:    T(C): 36.7 (06-03-22 @ 04:00), Max: 36.7 (06-02-22 @ 11:24)  HR: 67 (06-03-22 @ 04:00) (62 - 85)  BP: 128/66 (06-03-22 @ 04:00) (128/66 - 151/82)  RR: 17 (06-03-22 @ 04:00) (15 - 18)  SpO2: 93% (06-03-22 @ 04:00) (93% - 99%)      OUT:  Total OUT: 0 mL      OUT:  Total OUT: 0 mL            Medications:  aspirin  chewable 81 Oral daily  heparin   Injectable 5000 SubCutaneous every 8 hours    amLODIPine   Tablet 5 milliGRAM(s) Oral daily  influenza  Vaccine (HIGH DOSE) 0.7 milliLiter(s) IntraMuscular once  lidocaine 4% Injection for Nebulization 4 milliLiter(s) Nebulizer once  pantoprazole  Injectable 40 milliGRAM(s) IV Push two times a day        Laboratory:  WBC: 8.55 H&H: 11.8/36.1 Plt: 233  WBC: 7.33 H&H: 11.1/34.6 Plt: 231    Chemistry:  06-03                             Phos: xx Mg: xx  139  |  103  |  15  ----------------------------<  109  3.9   |  27  |  0.82        , 06-01                             Phos: xx Mg: xx  140  |  104  |  10  ----------------------------<  97  4.0   |  26  |  0.76          06-01   TPro 6.8 / Alb 4.0 / TBili 0.7 / DBili x  / AST/AST 13/9<L> / AlkPhos 67  PTT 44.1 PT/INR ---/---          Reviewed laboratory and imaging    Physical Exam:   Constitutional: resting in bed with no acute distress  Respiratory: unlabored breathing, clear respiration  Gastrointestinal: Abdomen soft, non distended, non-tender  Extremities:  No edema, no calf tenderness  Skin: no cyanosis or rash observed

## 2022-06-03 NOTE — DISCHARGE NOTE PROVIDER - CARE PROVIDER_API CALL
Aquilino Toney (DO)  Medicine  [address]  [place] [address]  Phone: [phone]  Fax: [phone]  Scheduled Appointment: 06/09/2022 02:00 PM    Ever Thornton)  Surgery  [address] [place] [address]  Phone: [phone]  Fax: [phone]  Follow Up Time:     Vu Troy (DO)  Cardiovascular Disease; Internal Medicine; Nuclear Cardiology  [address], [address]  [place] [address]  Phone: [phone]  Fax: [phone]  Follow Up Time:

## 2022-06-03 NOTE — PROGRESS NOTE ADULT - ASSESSMENT
67M active smoker w/ PMH HLD, Mediterranean anemia p/w 2-wk h/o epigastric abdominal pain found to have a large gastric mass    Recommendation  - Await biopsy results  - Please draw CEA, , and LDH with AM labs  - Will f/u CT scan chest for initial staging  - Further recommendations pending above work up, which can be discussed as an outpatient if patient is ready for discharge  - Please have patient follow up with Dr. Ever Thornton at [address], [phone]  - Can call with questions    Alexander Swain PGY2  Red Team Surgery [phone]

## 2022-06-03 NOTE — DISCHARGE NOTE NURSING/CASE MANAGEMENT/SOCIAL WORK - NSDCPEEMAIL_GEN_ALL_CORE
[facility] for Tobacco Control email [email or web address]

## 2022-06-03 NOTE — PROGRESS NOTE ADULT - PROVIDER SPECIALTY LIST ADULT
Gastroenterology
Internal Medicine
Surgery
Cardiology
Gastroenterology
Internal Medicine
Internal Medicine

## 2022-06-03 NOTE — DISCHARGE NOTE PROVIDER - NSDCCPCAREPLAN_GEN_ALL_CORE_FT
PRINCIPAL DISCHARGE DIAGNOSIS  Diagnosis: Abdominal pain  Assessment and Plan of Treatment: Found to have a large gastric mass  Follow up biopsy results and tumor markers  CT scan chest for initial staging  - Please have patient follow up with Dr. Ever Thornton at [address] [facility], [phone]      SECONDARY DISCHARGE DIAGNOSES  Diagnosis: Epigastric pain  Assessment and Plan of Treatment: EKG with discreet wade in V1-V3 but not diagnostic for injury pattern  - Cardiac enzymes negative x2  - c/w ASA 81 mg PO daily   - ECHO unremarkable  - no further inpt cardiac work up planned  -follow up with cardiology out-patient    Diagnosis: HTN (hypertension)  Assessment and Plan of Treatment: Low salt diet  Activity as tolerated.  Take all medication as prescribed.  Follow up with your medical doctor for routine blood pressure monitoring at your next visit.  Notify your doctor if you have any of the following symptoms:   Dizziness, Lightheadedness, Blurry vision, Headache, Chest pain, Shortness of breath

## 2022-06-03 NOTE — PROGRESS NOTE ADULT - NS ATTEND AMEND GEN_ALL_CORE FT
Pt care and plan discussed and reviewed with PA. Plan as outlined above edited by me to reflect our discussion. I had a prolonged conversation with the patient regarding hospital course, differential diagnosis and results of diagnostic tests.  Plan of care discussed with patient after the evaluation. Patient expresses clear understanding and satisfaction with the plan of care. OMT on six regions for acute somatic dysfunctions done at the bedside. Sixty five minutes spent on encounter, of which more than fifty percent of the encounter was spent on counseling and/or coordinating care by the attending physician. Advanced care planning/advanced directives discussed with patient/family. DNR status including forceful chest compressions to attempt to restart the heart, ventilator support/artificial breathing, electric shock, artificial nutrition, health care proxy, Molst form all discussed with pt. Pt wishes to consider.

## 2022-06-03 NOTE — PROGRESS NOTE ADULT - SUBJECTIVE AND OBJECTIVE BOX
INTERVAL HPI/OVERNIGHT EVENTS:    Pt seen and examined. family at bedside. No new overnight event.  No N/V/D.  Tolerating diet.    MEDICATIONS  (STANDING):  amLODIPine   Tablet 5 milliGRAM(s) Oral daily  aspirin  chewable 81 milliGRAM(s) Oral daily  heparin   Injectable 5000 Unit(s) SubCutaneous every 8 hours  influenza  Vaccine (HIGH DOSE) 0.7 milliLiter(s) IntraMuscular once  lidocaine 4% Injection for Nebulization 4 milliLiter(s) Nebulizer once  pantoprazole  Injectable 40 milliGRAM(s) IV Push two times a day  sodium chloride 0.9%. 500 milliLiter(s) (30 mL/Hr) IV Continuous <Continuous>    MEDICATIONS  (PRN):  acetaminophen     Tablet .. 650 milliGRAM(s) Oral every 6 hours PRN Temp greater or equal to 38C (100.4F), Mild Pain (1 - 3)  aluminum hydroxide/magnesium hydroxide/simethicone Suspension 30 milliLiter(s) Oral every 4 hours PRN Dyspepsia  melatonin 3 milliGRAM(s) Oral at bedtime PRN Insomnia  ondansetron Injectable 4 milliGRAM(s) IV Push every 8 hours PRN Nausea and/or Vomiting      Allergies    No Known Allergies    Intolerances        Review of Systems:    Review of Systems:  General:  No wt loss, fevers, chills, night sweats, fatigue,   Eyes:  Good vision, no reported pain  ENT:  No sore throat, pain, runny nose, dysphagia  CV:  No pain, palpitations, hypo/hypertension  Resp:  No dyspnea, cough, tachypnea, wheezing  GI:  See HPI  :  No pain, bleeding, incontinence, nocturia  Muscle:  No pain, weakness  Neuro:  No weakness, tingling, memory problems  Psych:  No fatigue, insomnia, mood problems, depression  Endocrine:  No polyuria, polydipsia, cold/heat intolerance  Heme:  No petechiae, ecchymosis, easy bruisability  Integumentary:  No rash, edema    Vital Signs Last 24 Hrs  T(C): 36.7 (03 Jun 2022 11:06), Max: 36.7 (02 Jun 2022 11:24)  T(F): 98 (03 Jun 2022 11:06), Max: 98.1 (02 Jun 2022 11:24)  HR: 60 (03 Jun 2022 11:06) (60 - 75)  BP: 154/80 (03 Jun 2022 11:06) (128/66 - 166/73)  BP(mean): --  RR: 18 (03 Jun 2022 11:06) (17 - 19)  SpO2: 95% (03 Jun 2022 11:06) (93% - 95%)    PHYSICAL EXAM:    Constitutional: NAD, well-developed  HEENT: EOMI, throat clear  Neck: No LAD, supple  Respiratory: CTA and P  Cardiovascular: S1 and S2, RRR, no M  Gastrointestinal: BS+, soft, NT/ND, neg HSM,  Extremities: No peripheral edema, neg clubing, cyanosis  Vascular: 2+ peripheral pulses  Neurological: A/O x 3, no focal deficits  Psychiatric: Normal mood, normal affect  Skin: No rashes      LABS:                        11.8   8.55  )-----------( 233      ( 03 Jun 2022 05:23 )             36.1     06-03    139  |  103  |  15  ----------------------------<  109<H>  3.9   |  27  |  0.82    Ca    9.3      03 Jun 2022 05:23            RADIOLOGY & ADDITIONAL TESTS:

## 2022-06-03 NOTE — PROGRESS NOTE ADULT - ASSESSMENT
67M active smoker w/ PMH HLD, Mediterranean anemia p/w 2-wk h/o epigastric abdominal pain.     Abdominal pain.   - PT with extensive smoking history (80-pack-year) and HTN not on meds  - lipid panel unremarkable   - A1C of 5.3   - CT A/P w/ IV Contrast noted - GI eval called  - Pt reports recent [place]/EGD 7-8 months ago WNL, was treated for a "bacteria" (does not recall if H. pylori)  - Cont with PPI IV BID   - S/P EGD with Malignant appearing gastric ulcer; F/U pathology - Discussed with patient  - Checking CT Chest non-cont -- as above with centrilobar nodules and tree-in bud opacities --> Outpatient routine repeat CT w/ PCP upon discharge   - Surg/Onc - Dr. Thornton called for evaluation   - Checking tumor markers - CEA 3.8 and CA 19-9 <2    Abnormal EKG  - Given CASS in Ant leads, c/f ACS however trops neg x3  - s/p ASA loading. S/P hep gtt  possible ACS -- ruled out   - evaluated by cards in ED and not impressed ACS, recommending repeat trops x3 and EKG    - Was on Hep Gtt, now D/C  - Appreciate cardio recs  - TTE as above with EF of 71%, Minimal MR, Normal LB systolic function, no WMA, normal diastolic function       HLD (hyperlipidemia).   - Lipid panel noted   - C/w atorvastatin 20mg.    HTN (hypertension).   -will start on low-dose norvasc. continue on discharge   - Monitor BP, VS and patient closely  - adjust as tolerated      Anemia.   - Due to history of mediterranean anemia  -maintain active T&S  -transfuse Hb<7.    Smoking cessation  - C/w Encouragement towards smoking cessation   - Discussed with patient at length   - Pt non-compliant for patch     Prophylactic measure.    -Diet: DASH/TLC  - Heparin DVT PPX dose

## 2022-06-03 NOTE — DISCHARGE NOTE PROVIDER - NSDCMRMEDTOKEN_GEN_ALL_CORE_FT
famotidine 20 mg oral tablet: 1 tab(s) orally 2 times a day    amLODIPine 5 mg oral tablet: 1 tab(s) orally once a day  aspirin 81 mg oral tablet, chewable: 1 tab(s) orally once a day  famotidine 20 mg oral tablet: 1 tab(s) orally 2 times a day   Protonix 40 mg oral delayed release tablet: 1 tab(s) orally 2 times a day

## 2022-06-03 NOTE — DISCHARGE NOTE NURSING/CASE MANAGEMENT/SOCIAL WORK - PATIENT PORTAL LINK FT
You can access the FollowMyHealth Patient Portal offered by [facility] by registering at the following website: [email or web address]. By joining [employer]’s FollowMyHealth portal, you will also be able to view your health information using other applications (apps) compatible with our system.

## 2022-06-03 NOTE — DISCHARGE NOTE PROVIDER - NSDCFUADDAPPT_GEN_ALL_CORE_FT
APPTS ARE READY TO BE MADE: [x ] YES    Best Family or Patient Contact (if needed):    Additional Information about above appointments (if needed):    1:   2:   3:     Other comments or requests:    APPTS ARE READY TO BE MADE: [x ] YES    Best Family or Patient Contact (if needed):    Additional Information about above appointments (if needed):    1: Patient was previously scheduled with Aquilino Ingram on 06/09 2:00p, at [address] [facility]  2: Patient was previously scheduled with Ever Muniz on 06/14 3:00 PM at [address] [facility].  3: Patient was provided with (doctors name and information) and was advised to call to schedule follow up within specified time frame. At this time patient declined scheduling assistance.    Other comments or requests:

## 2022-06-03 NOTE — DISCHARGE NOTE NURSING/CASE MANAGEMENT/SOCIAL WORK - NSDCPEWEB_GEN_ALL_CORE
[facility] for Tobacco Control website --- [email or web address] website --- [email or web address]

## 2022-06-03 NOTE — DISCHARGE NOTE PROVIDER - PROVIDER TOKENS
PROVIDER:[TOKEN:[[id number]],SCHEDULEDAPPT:[06/09/2022],SCHEDULEDAPPTTIME:[02:00 PM]],PROVIDER:[TOKEN:[[id number]]],PROVIDER:[TOKEN:[[id number]]]

## 2022-06-03 NOTE — DISCHARGE NOTE PROVIDER - HOSPITAL COURSE
[facility]    [place] History and Physical    Date of Admission:  2017  2:15 PM  Date of Service (when I saw the patient): 17    Primary Care Physician   Primary care provider: No primary care provider on file.    Assessment & Plan   Baby1 Yaquelin Verdin is a 38 2/7  appropriate for gestational age male  , doing well.   -Normal  care    Dr. Nancy Pillai MD    Pregnancy History   The details of the mother's pregnancy are as follows:  OBSTETRIC HISTORY:  Information for the patient's mother:  Yaquelin Verdin [[id number]]   33 year old    EDC:   Information for the patient's mother:  Yaquelin Verdin [[id number]]   Estimated Date of Delivery: 3/7/17    Information for the patient's mother:  Yaquelin Verdin [[id number]]     Obstetric History       T2      TAB0   SAB1   E0   M0   L2       # Outcome Date GA Lbr Jung/2nd Weight Sex Delivery Anes PTL Lv   3 Term 17 38w5d  6 lb 9.5 oz (2.99 kg) M CS-LTranv Spinal N Y      Name: YANIV VERDIN      Apgar1:  9                Apgar5: 9   2 Term            1 SAB                   Prenatal Labs: Information for the patient's mother:  Yaquelin Verdin [[id number]]     Lab Results   Component Value Date    ABO A 2017    RH  Pos 2017    AS Neg 2017    HEPBANG Non reactive 2016    TREPAB Non reactive 2016    HGB 8.9 (L) 2017    PATH  2015     Patient Name: YAQUELIN FAUSTIN  MR#: [id number]  Specimen #: [id number]  Collected: 2015  Received: 2015  Reported: 2015 14:54  Ordering Phy(s): JASPER POLLARD  Additional Phy(s): MIQUEL VALENZUELA              SPECIMEN(S):  Colon biopsy, random    FINAL DIAGNOSIS:  Colon, random biopsies-  -Moderate to severe active colitis with background mild chronic colitis;  see comment.  -Negative for dysplasia or malignancy.    COMMENT:  The pattern of mucosal inflammation is nonspecific in nature.   "Negative  stool culture results, absence of Shiga-toxins, and negative testing for  C. difficile toxin by PCR are noted.  Test results for stool ova and  parasites are unavailable.  Mild crypt architectural disarray suggests a  component of chronic colitis; the morphologic features would be  compatible with active chronic inflammatory bowel disease in the  appropriate clinical setting.  Please correlate with clinical and  endoscopic findings.      Electronically signed out by:    Nimisha West M.D.      CLINICAL HISTORY:  Colitis.      GROSS:  The specimen labeled \"random colon biopsy\" consists of 6 tan-pink soft  tissue fragments ranging in greatest dimension from 0.1 - 0.2 cm.  Entirely submitted in a single cassette. (Dictated by: Nimisha West MD  2/24/2015 11:18 AM)    MICROSCOPIC:  The sections demonstrate few colonic mucosal biopsy fragments showing  diffuse moderate to severe acute colitis characterized by presence of  brisk cryptitis and frequent crypt abscess formation.  The background  colonic crypt architecture is mostly intact with focal areas of crypt  architectural disarray.  No distinct mucosal ulceration or  pseudomembranes are present.  No granulomas are identified.  There is no  evidence of dysplasia or malignancy.            CPT Codes:  A: 75001-BF5    TESTING LAB LOCATION:  [facility] [address] [place]  [address]  [phone]    COLLECTION SITE:  Client: [facility]  Location: [facility]5 (R)         Prenatal Ultrasound:  Information for the patient's mother:  Angelia Verdin [[id number]]     Results for orders placed or performed during the hospital encounter of 02/21/15   CT Abdomen Pelvis w Contrast    Narrative    CT ABDOMEN AND PELVIS WITH CONTRAST 2/21/2015 11:35 AM     HISTORY: Left lower quadrant and suprapubic pain. Evaluate for  colitis.     TECHNIQUE: Axial images from the lung bases to the symphysis are  performed with additional " "coronal reformatted images. 61 mL of Isovue  370 are given intravenously.      FINDINGS: The lung bases are clear.    Abdomen: The upper abdominal organs are within normal limits. A tiny  subcentimeter probable cyst is present in the right kidney on image  31. This is too small to characterize by CT. No hydronephrosis. No  enlarged abdominal lymph nodes. The bowel is normal in caliber without  obstruction. There is wall thickening and inflammation involving the  colon from the cecum to the rectum raising suspicion for a pancolitis  either of infectious or inflammatory origin. Ulcerative colitis not  excluded. No bowel distention, abscess or free intraperitoneal air is  evident.    Pelvis: The bladder, uterus and adnexal regions are within normal  limits. No enlarged pelvic lymph nodes or free fluid. Bone window  examination is within normal limits.      Impression    IMPRESSION:  1. Pancolitis either representing an infectious pancolitis or possibly  ulcerative colitis. Followup with Gastroenterology for colonoscopy for  further assessment.  2. No evidence of bowel obstruction, diverticulitis or appendicitis.  3. Tiny right renal cortical lesion likely a cyst but too small to  characterize by CT. This is of doubtful clinical significance.    GULSHAN ROTHMAN MD       GBS Status:   Information for the patient's mother:  Angelia Verdin [[id number]]     Lab Results   Component Value Date    GBS Negative  2017     negative    Maternal History    Maternal past medical history, problem list and prior to admission medications reviewed and unremarkable.    Medications given to Mother since admit:  reviewed     Family History -    I have reviewed this patient's family history    Social History - [place]   I have reviewed this 's social history    Birth History   Infant Resuscitation Needed: no    [place] Birth Information  Birth History     Birth     Length: 1' 8\" (0.508 m)     Weight: 6 lb 9.5 oz (2.99 " "kg)     HC 14\" (35.6 cm)     Apgar     One: 9     Five: 9     Delivery Method: , Low Transverse     Gestation Age: 38 5/7 wks       The NICU staff was not present during birth.    Immunization History   Immunization History   Administered Date(s) Administered     Hepatitis B 2017        Physical Exam   Vital Signs:  Patient Vitals for the past 24 hrs:   Temp Temp src Pulse Heart Rate Resp Height Weight   17 0805 98.5  F (36.9  C) Axillary - 122 52 - -   17 2338 98.1  F (36.7  C) Axillary - 125 44 - -   17 2000 98.3  F (36.8  C) Axillary 130 - - - -   17 1600 99  F (37.2  C) Axillary 135 - 50 - -   17 1530 98.4  F (36.9  C) Axillary 132 - 55 - -   17 1500 98.4  F (36.9  C) Axillary 125 - 52 - -   17 1415 97.9  F (36.6  C) Axillary 115 - 48 1' 8\" (0.508 m) 6 lb 9.5 oz (2.99 kg)      Measurements:  Weight: 6 lb 9.5 oz (2990 g)    Length: 20\"    Head circumference: 35.6 cm      General:  alert and normally responsive  Skin:  no abnormal markings; normal color without significant rash.  No jaundice  Head/Neck:  normal anterior and posterior fontanelle, intact scalp; Neck without masses  Eyes:  normal red reflex, clear conjunctiva  Ears/Nose/Mouth:  intact canals, patent nares, mouth normal  Thorax:  normal contour, clavicles intact  Lungs:  clear, no retractions, no increased work of breathing  Heart:  normal rate, rhythm.  No murmurs.  Normal femoral pulses.  Abdomen:  soft without mass, tenderness, organomegaly, hernia.  Umbilicus normal.  Genitalia:  normal male external genitalia with testes descended bilaterally  Anus:  patent  Trunk/spine:  straight, intact  Muskuloskeletal:  Normal Jimenez and Ortolani maneuvers.  intact without deformity.  Normal digits.  Neurologic:  normal, symmetric tone and strength.  normal reflexes.    Data    All laboratory data reviewed  " 67M active smoker w/ PMH HLD, Mediterranean anemia p/w 2-wk h/o epigastric abdominal pain found to have a large gastric mass    Recommendation  - Await biopsy results  - Please draw CEA, , and LDH with AM labs  - Will f/u CT scan chest for initial staging  - Further recommendations pending above work up, which can be discussed as an outpatient if patient is ready for discharge  - Please have patient follow up with Dr. Ever Thornton at [address], [phone]  - Can call with questions      67M active smoker w/ PMH HLD, Mediterranean anemia p/w 2-wk h/o epigastric abdominal pain.     Abdominal pain.   - PT with extensive smoking history (80-pack-year) and HTN not on meds  - lipid panel unremarkable   - A1C of 5.3   - CT A/P w/ IV Contrast noted - GI eval called  - Pt reports recent F[place]/EGD 7-8 months ago WNL, was treated for a "bacteria" (does not recall if H. pylori)  - Cont with PPI IV BID   - S/P EGD with Malignant appearing gastric ulcer; F/U pathology - Discussed with patient  - Checking CT Chest non-cont  - Surg/Onc - Dr. Thornton called for evaluation   - Checking tumor markers - CEA and CA 19-9    Abnormal EKG  - Given CASS in Ant leads, c/f ACS however trops neg x3  - s/p ASA loading. S/P hep gtt  possible ACS -- ruled out   - evaluated by cards in ED and not impressed ACS, recommending repeat trops x3 and EKG    - Was on Hep Gtt, now D/C  - Appreciate cardio recs  - TTE as above with EF of 71%, Minimal MR, Normal LB systolic function, no WMA, normal diastolic function   - Possible CTA per cardio     HLD (hyperlipidemia).   - Lipid panel noted   - C/w atorvastatin 20mg.    HTN (hypertension).   -uncontrolled and pt on no home meds  -will start on low-dose norvasc.  - Monitor BP, VS and patient closely  - adjust as tolerated      Anemia.   - Due to history of mediterranean anemia  -maintain active T&S  -transfuse Hb<7.    Smoking cessation  - C/w Encouragement towards smoking cessation   - Discussed with patient at length     Prophylactic measure.    -Diet: DASH/TLC  - Heparin DVT PPX dose 67M active smoker w/ PMH HLD, Mediterranean anemia p/w 2-wk h/o epigastric abdominal pain found to have large gastric mass.     Abdominal pain.   - CT A/P w/ IV Contrast noted - GI eval called  - Pt reports recent [place]/EGD 7-8 months ago WNL, was treated for a "bacteria" (does not recall if H. pylori)  - Cont with PPI IV BID   - S/P EGD with Malignant appearing gastric ulcer; F/U pathology - Discussed with patient  - Await biopsy results  - Please draw CEA, , and LDH with AM labs  - Will f/u CT scan chest for initial staging  - Further recommendations pending above work up, which can be discussed as an outpatient if patient is ready for discharge    Epigastric pain   - EKG with discreet wade in V1-V3 but not diagnostic for injury pattern  - Troponin negative x2  - Patient denies chest pain or shortness of breath.   - POCUS reveals no pericardial effusion and no global WMA  - CXR with small left pleural efusion with left basilar atelectasis.  - c/w ASA 81 mg PO daily   - TTE unremarkable  - no further inpt cardiac work up planned    HLD (hyperlipidemia).   - Lipid panel noted   - C/w atorvastatin 20mg.    HTN (hypertension).   -uncontrolled and pt on no home meds  -will start on low-dose norvasc.  - Monitor BP, VS and patient closely  - adjust as tolerated      Anemia.   - Due to history of mediterranean anemia  -maintain active T&S  -transfuse Hb<7.    Smoking cessation  - C/w Encouragement towards smoking cessation   - Discussed with patient at length

## 2022-06-03 NOTE — PROGRESS NOTE ADULT - REASON FOR ADMISSION
abdominal pain
abdominal pain  gastric mass

## 2022-06-03 NOTE — DISCHARGE NOTE PROVIDER - CARE PROVIDERS DIRECT ADDRESSES
,DirectAddress_Unknown,[email or web address],DirectAddress_Unknown

## 2022-06-14 ENCOUNTER — APPOINTMENT (OUTPATIENT)
Dept: SURGICAL ONCOLOGY | Facility: CLINIC | Age: 67
End: 2022-06-14
Payer: COMMERCIAL

## 2022-06-14 ENCOUNTER — NON-APPOINTMENT (OUTPATIENT)
Age: 67
End: 2022-06-14

## 2022-06-14 VITALS
BODY MASS INDEX: 24.25 KG/M2 | SYSTOLIC BLOOD PRESSURE: 131 MMHG | HEART RATE: 70 BPM | RESPIRATION RATE: 17 BRPM | HEIGHT: 68 IN | WEIGHT: 160 LBS | OXYGEN SATURATION: 98 % | TEMPERATURE: 97.8 F | DIASTOLIC BLOOD PRESSURE: 76 MMHG

## 2022-06-14 DIAGNOSIS — K31.89 OTHER DISEASES OF STOMACH AND DUODENUM: ICD-10-CM

## 2022-06-14 PROCEDURE — 99213 OFFICE O/P EST LOW 20 MIN: CPT

## 2022-06-14 RX ORDER — FOLIC ACID 1 MG/1
1 TABLET ORAL
Qty: 30 | Refills: 0 | Status: ACTIVE | COMMUNITY
Start: 2021-08-24

## 2022-06-14 RX ORDER — AMLODIPINE BESYLATE 5 MG/1
5 TABLET ORAL
Qty: 30 | Refills: 0 | Status: ACTIVE | COMMUNITY
Start: 2022-06-03

## 2022-06-14 RX ORDER — ATORVASTATIN CALCIUM 20 MG/1
20 TABLET, FILM COATED ORAL
Qty: 30 | Refills: 0 | Status: ACTIVE | COMMUNITY
Start: 2021-08-24

## 2022-06-14 RX ORDER — FAMOTIDINE 20 MG/1
20 TABLET, FILM COATED ORAL
Qty: 60 | Refills: 0 | Status: ACTIVE | COMMUNITY
Start: 2022-05-27

## 2022-06-14 RX ORDER — PANTOPRAZOLE 40 MG/1
40 TABLET, DELAYED RELEASE ORAL
Qty: 60 | Refills: 0 | Status: ACTIVE | COMMUNITY
Start: 2022-06-03

## 2022-06-14 RX ORDER — NIACIN 500 MG/1
500 TABLET, EXTENDED RELEASE ORAL
Qty: 30 | Refills: 0 | Status: ACTIVE | COMMUNITY
Start: 2022-04-05

## 2022-06-16 NOTE — HISTORY OF PRESENT ILLNESS
[de-identified] : Mr. Jordan is a 66 y/o male who was recently admitted to [facility] for severe upper abdominal/epigastric pain.\par CT abdomen/pelvis 06/01/2022 demonstrated focal soft tissue thickening of the lesser curvature with large ulceration defect associated with minimal adjacent perigastric fat infiltration and subcentimeter lymph nodes.  Findings were concerning gastric neoplasm.\par CT chest 06/03/2022 showed no evidence of intrathoracic malignancy.\par EGD 06/02/2022 showed a massive (10 cm) crated ulcer at the incisura concerning for malignancy - biopsies were taken.\par Pathology is pending at this time.\par His symptoms have markedly improved with PPI.

## 2022-06-16 NOTE — ASSESSMENT
[FreeTextEntry1] : Large lesser curvature malignant appearing ulcer - concern for adenocarcinoma versus lymphoma.\par Continue PPI.\par Await pathology to determine treatment plan.

## 2022-06-17 LAB
DNA PLOIDY SPEC FC-IMP: SIGNIFICANT CHANGE UP
DNA PLOIDY SPEC FC-IMP: SIGNIFICANT CHANGE UP

## 2022-06-22 NOTE — CHART NOTE - NSCHARTNOTEFT_GEN_A_CORE
I have reviewed the pathology results with Mr. Jordan and his wife. As per my discussion with Dr. Park of pathology, additional tissue is needed to identify the origin of this gastric tumor. I have arranged for the patient to have EGD/EUS with extensive biopsies and notified this as of 6/18, but the patient and his wife have refused to make this appointment despite multiple attempts to schedule, and multiple attempts on my part to explain the seriousness of the diagnosis and the need for prompt follow up. I explained that although the procedure time was 6/2/22, it took over two weeks to obtain the pathology result due to the poorly differentiated nature of the tumor, as the pathologist initially thought the lineage could be identified with the available tissue. In addition I have spoken tonight with Dr. Merchant, the [facility]'s PCP, who agrees to help impress on the patient and his wife the urgency of further follow up.

## 2022-06-24 LAB — SURGICAL PATHOLOGY STUDY: SIGNIFICANT CHANGE UP

## 2022-06-30 ENCOUNTER — RESULT REVIEW (OUTPATIENT)
Age: 67
End: 2022-06-30

## 2022-06-30 ENCOUNTER — TRANSCRIPTION ENCOUNTER (OUTPATIENT)
Age: 67
End: 2022-06-30

## 2022-06-30 ENCOUNTER — OUTPATIENT (OUTPATIENT)
Dept: OUTPATIENT SERVICES | Facility: HOSPITAL | Age: 67
LOS: 1 days | End: 2022-06-30
Payer: COMMERCIAL

## 2022-06-30 VITALS
SYSTOLIC BLOOD PRESSURE: 117 MMHG | OXYGEN SATURATION: 97 % | HEART RATE: 63 BPM | RESPIRATION RATE: 16 BRPM | DIASTOLIC BLOOD PRESSURE: 75 MMHG

## 2022-06-30 VITALS
OXYGEN SATURATION: 97 % | WEIGHT: 160.06 LBS | HEIGHT: 71 IN | RESPIRATION RATE: 20 BRPM | DIASTOLIC BLOOD PRESSURE: 73 MMHG | TEMPERATURE: 97 F | SYSTOLIC BLOOD PRESSURE: 116 MMHG | HEART RATE: 61 BPM

## 2022-06-30 DIAGNOSIS — Z98.890 OTHER SPECIFIED POSTPROCEDURAL STATES: Chronic | ICD-10-CM

## 2022-06-30 DIAGNOSIS — D37.1 NEOPLASM OF UNCERTAIN BEHAVIOR OF STOMACH: ICD-10-CM

## 2022-06-30 PROCEDURE — 88360 TUMOR IMMUNOHISTOCHEM/MANUAL: CPT

## 2022-06-30 PROCEDURE — 88305 TISSUE EXAM BY PATHOLOGIST: CPT | Mod: 26

## 2022-06-30 PROCEDURE — 88342 IMHCHEM/IMCYTCHM 1ST ANTB: CPT

## 2022-06-30 PROCEDURE — 43239 EGD BIOPSY SINGLE/MULTIPLE: CPT | Mod: XS

## 2022-06-30 PROCEDURE — 88173 CYTOPATH EVAL FNA REPORT: CPT

## 2022-06-30 PROCEDURE — 88341 IMHCHEM/IMCYTCHM EA ADD ANTB: CPT | Mod: 26,59

## 2022-06-30 PROCEDURE — 88342 IMHCHEM/IMCYTCHM 1ST ANTB: CPT | Mod: 26,59

## 2022-06-30 PROCEDURE — 88172 CYTP DX EVAL FNA 1ST EA SITE: CPT

## 2022-06-30 PROCEDURE — 88365 INSITU HYBRIDIZATION (FISH): CPT

## 2022-06-30 PROCEDURE — 43238 EGD US FINE NEEDLE BX/ASPIR: CPT

## 2022-06-30 PROCEDURE — 88173 CYTOPATH EVAL FNA REPORT: CPT | Mod: 26

## 2022-06-30 PROCEDURE — 88341 IMHCHEM/IMCYTCHM EA ADD ANTB: CPT

## 2022-06-30 PROCEDURE — 88365 INSITU HYBRIDIZATION (FISH): CPT | Mod: 26

## 2022-06-30 PROCEDURE — 88360 TUMOR IMMUNOHISTOCHEM/MANUAL: CPT | Mod: 26

## 2022-06-30 PROCEDURE — 88305 TISSUE EXAM BY PATHOLOGIST: CPT

## 2022-06-30 DEVICE — KIT CVC 2LUM MAC 9FR CHG: Type: IMPLANTABLE DEVICE | Status: FUNCTIONAL

## 2022-06-30 DEVICE — CATH THERMODIL PACE 7.5FR: Type: IMPLANTABLE DEVICE | Status: FUNCTIONAL

## 2022-06-30 DEVICE — KIT A-LINE 1LUM 20G X 12CM SAFE KIT: Type: IMPLANTABLE DEVICE | Status: FUNCTIONAL

## 2022-06-30 RX ORDER — IPRATROPIUM/ALBUTEROL SULFATE 18-103MCG
3 AEROSOL WITH ADAPTER (GRAM) INHALATION ONCE
Refills: 0 | Status: DISCONTINUED | OUTPATIENT
Start: 2022-06-30 | End: 2022-07-15

## 2022-06-30 RX ORDER — LIDOCAINE HCL 20 MG/ML
4 VIAL (ML) INJECTION ONCE
Refills: 0 | Status: DISCONTINUED | OUTPATIENT
Start: 2022-06-30 | End: 2022-07-15

## 2022-06-30 RX ORDER — SODIUM CHLORIDE 9 MG/ML
500 INJECTION INTRAMUSCULAR; INTRAVENOUS; SUBCUTANEOUS
Refills: 0 | Status: DISCONTINUED | OUTPATIENT
Start: 2022-06-30 | End: 2022-07-15

## 2022-06-30 NOTE — ASU DISCHARGE PLAN (ADULT/PEDIATRIC) - NS MD DC FALL RISK RISK
For information on Fall & Injury Prevention, visit: [email or web address] OR  [email or web address] OR  https://www.cdc.gov/steadi/patient.html

## 2022-06-30 NOTE — PRE PROCEDURE NOTE - PRE PROCEDURE EVALUATION
Attending Physician:     Jcarlos Kyle MD                       Procedure:  EUS    Indication for Procedure: gastric neoplasia  ________________________________________________________  PAST MEDICAL & SURGICAL HISTORY:  Hypercholesterolemia      Mediterranean anemia      S/P hernia surgery        ALLERGIES:  No Known Allergies    HOME MEDICATIONS:  aspirin 81 mg oral tablet, chewable: 1 tab(s) orally once a day    AICD/PPM: [ ] yes   [x ] no    PERTINENT LAB DATA:                      PHYSICAL EXAMINATION:    Height (cm): 180.3  Weight (kg): 72.6  BMI (kg/m2): 22.3  BSA (m2): 1.92T(C): 36.2  HR: 61  BP: 116/73  RR: 20  SpO2: 97%    Constitutional: NAD  HEENT: PERRLA, EOMI,    Neck:  No JVD  Respiratory: CTAB/L  Cardiovascular: S1 and S2  Gastrointestinal: BS+, soft, NT/ND  Extremities: No peripheral edema  Neurological: A/O x 3, no focal deficits  Psychiatric: Normal mood, normal affect  Skin: No rashes    ASA Class: I [ ]  II [ ]  III [x ]  IV [ ]    COMMENTS:    The patient is a suitable candidate for the planned procedure unless box checked [ ]  No, explain:

## 2022-07-06 LAB — NON-GYNECOLOGICAL CYTOLOGY STUDY: SIGNIFICANT CHANGE UP

## 2022-07-27 LAB — SURGICAL PATHOLOGY STUDY: SIGNIFICANT CHANGE UP

## 2022-07-28 DIAGNOSIS — C96.4: ICD-10-CM

## 2022-07-30 PROBLEM — E78.00 PURE HYPERCHOLESTEROLEMIA, UNSPECIFIED: Chronic | Status: ACTIVE | Noted: 2022-05-20

## 2022-07-30 PROBLEM — D56.9 THALASSEMIA, UNSPECIFIED: Chronic | Status: ACTIVE | Noted: 2022-05-20

## 2022-08-09 ENCOUNTER — APPOINTMENT (OUTPATIENT)
Dept: NUCLEAR MEDICINE | Facility: CLINIC | Age: 67
End: 2022-08-09

## 2022-08-26 ENCOUNTER — APPOINTMENT (OUTPATIENT)
Dept: SURGICAL ONCOLOGY | Facility: HOSPITAL | Age: 67
End: 2022-08-26

## 2022-10-03 ENCOUNTER — APPOINTMENT (OUTPATIENT)
Dept: GASTROENTEROLOGY | Facility: CLINIC | Age: 67
End: 2022-10-03

## 2023-04-12 NOTE — PRE-OP CHECKLIST - ALLERGIES REVIEWED
Monitor: The problem is newly identified.  Evaluation: No labs/tests required today.  Assessment/Treatment:  Update treatment regimen per encounter summary     done

## 2023-08-23 NOTE — H&P ADULT - PROBLEM/PLAN-5
teenage    infection    OTHER SURGICAL HISTORY  3/22/2012    ORIF bi-maleolar fx    SINUS SURGERY      TONSILLECTOMY         FAMILY HISTORY: Family history reviewed and except as pertinent to the interim history is not contributory. REVIEW OF SYSTEMS:  All pertinent positive and negative findings included in HPI. Otherwise, all other systems are reviewed and are negative    PHYSICAL EXAMINATION:   GENERAL: wdwn- no acute distress  RESPIRATORY: No stridor. COMMUNICATION :  Normal voice  MENTAL STATUS: Alert and oriented x3  HEAD AND FACE: No skin lesions of the face. EXTERNAL EARS AND NOSE: The external ears and nasal pyramid are normal.  FACIAL MUSCLES: All branches of the facial nerve intact. FACE PALPATION: Zygomatic arches and orbital rims are intact. No tenderness over the sinuses. EXTRAOCULAR MUSCLES: Intact with full range of motion. OTOSCOPY:  Normal tympanic membranes, middle ear spaces, and external auditory canals. TUNING FORKS:  Rinne ++ Alva midline at 512 Hz  INTRANASAL:  Septum midline, turbinates normal, meati clear. LIPS, TEETH, GINGIVA:  Normal mucosa  PHARYNX:  Normal  NECK:  No masses. LYMPH NODES: No cervical lymphadenopathy. SALIVARY GLANDS: Parotid and submandibular glands normal.  THYROID: No goiter or thyroid nodules palpable. As the patient has symptoms suggestive of disease in the larynx or hypopharynx, fiberoptic laryngoscopy is performed. FIBEROPTIC LARYNGOSCOPY:  Nares topically anaesthetized with lidocaine spray. Fiberoptic scope passed per naris into nasopharynx and hypopharyrnx and larynx visualized. Normal tongue base                                                          Normal epiglottis                                                          There is edema of the false vocal cords, more on the left than on the right.   Vocal cord mobility is normal. DISPLAY PLAN FREE TEXT

## 2024-09-13 NOTE — ED ADULT NURSE NOTE - NS ED NOTE  TALK SOMEONE YN
[Follow-Up Visit] : a follow-up [[employer] ] : provided by [employer]   [FreeTextEntry2] : Stage IV colon cancer with ANGELI pump [Interpreters_IDNumber] : [id number] [Interpreters_FullName] : Larry [FreeTextEntry3] : Mandarin [TWNoteComboBox1] : Chinese No

## (undated) DEVICE — BALLOON US ENDO

## (undated) DEVICE — SYR ALLIANCE II INFLATION 60ML

## (undated) DEVICE — IRRIGATOR BIO SHIELD

## (undated) DEVICE — SYR IV FLUSH SALINE 10ML 30/TY

## (undated) DEVICE — SYR LUER LOK 10CC

## (undated) DEVICE — TUBING SUCTION 20FT

## (undated) DEVICE — PACK IV START WITH CHG

## (undated) DEVICE — BITE BLOCK ADULT 20 X 27MM (GREEN)

## (undated) DEVICE — FOLEY HOLDER STATLOCK 2 WAY ADULT

## (undated) DEVICE — SYS BIOPSY NDL FILE SS STRL 22G

## (undated) DEVICE — BIOPSY FORCEP RADIAL JAW 4 STANDARD WITH NEEDLE

## (undated) DEVICE — CATH IV SAFE BC 20G X 1.16" (PINK)

## (undated) DEVICE — SENSOR O2 FINGER ADULT

## (undated) DEVICE — TUBING IV SET GRAVITY 3Y 100" MACRO

## (undated) DEVICE — TUBING SUCTION CONN 6FT STERILE

## (undated) DEVICE — TUBE VENOUS BLOOD COLLECTION LIGHT GREEN TOP

## (undated) DEVICE — FORCEP RADIAL JAW 4 JUMBO 2.8MM 3.2MM 240CM ORANGE DISP

## (undated) DEVICE — CATH IV SAFE BC 22G X 1" (BLUE)

## (undated) DEVICE — CATH BLLN ULTRASONIC ENSOSCOPE

## (undated) DEVICE — SOL INJ NS 0.9% 500ML 2 PORT

## (undated) DEVICE — SUCTION YANKAUER NO CONTROL VENT